# Patient Record
Sex: MALE | Race: WHITE | NOT HISPANIC OR LATINO | Employment: OTHER | URBAN - METROPOLITAN AREA
[De-identification: names, ages, dates, MRNs, and addresses within clinical notes are randomized per-mention and may not be internally consistent; named-entity substitution may affect disease eponyms.]

---

## 2018-05-15 ENCOUNTER — APPOINTMENT (OUTPATIENT)
Dept: RADIOLOGY | Facility: MEDICAL CENTER | Age: 78
End: 2018-05-15
Attending: EMERGENCY MEDICINE
Payer: MEDICARE

## 2018-05-15 ENCOUNTER — HOSPITAL ENCOUNTER (EMERGENCY)
Facility: MEDICAL CENTER | Age: 78
End: 2018-05-15
Attending: EMERGENCY MEDICINE
Payer: MEDICARE

## 2018-05-15 VITALS
SYSTOLIC BLOOD PRESSURE: 111 MMHG | OXYGEN SATURATION: 91 % | WEIGHT: 190 LBS | DIASTOLIC BLOOD PRESSURE: 59 MMHG | TEMPERATURE: 98.5 F | HEART RATE: 57 BPM | HEIGHT: 68 IN | BODY MASS INDEX: 28.79 KG/M2 | RESPIRATION RATE: 18 BRPM

## 2018-05-15 DIAGNOSIS — S09.90XA CLOSED HEAD INJURY, INITIAL ENCOUNTER: ICD-10-CM
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
## 2018-05-15 LAB
INR PPP: 1.6 (ref 0.87–1.13)
PROTHROMBIN TIME: 18.7 SEC (ref 12–14.6)

## 2018-05-15 PROCEDURE — 99284 EMERGENCY DEPT VISIT MOD MDM: CPT

## 2018-05-15 PROCEDURE — 85610 PROTHROMBIN TIME: CPT

## 2018-05-15 PROCEDURE — 70450 CT HEAD/BRAIN W/O DYE: CPT

## 2018-05-15 PROCEDURE — 36415 COLL VENOUS BLD VENIPUNCTURE: CPT

## 2018-05-15 ASSESSMENT — PAIN SCALES - WONG BAKER: WONGBAKER_NUMERICALRESPONSE: DOESN'T HURT AT ALL

## 2018-05-15 ASSESSMENT — PAIN SCALES - GENERAL: PAINLEVEL_OUTOF10: 0

## 2018-05-15 ASSESSMENT — LIFESTYLE VARIABLES: DO YOU DRINK ALCOHOL: NO

## 2018-05-16 NOTE — ED PROVIDER NOTES
"ED Provider Note    Scribed for Amanda Beck M.D. by Eugenia Pereira. 5/15/2018  6:02 PM    Primary care provider: Randall Purdy M.D.  Means of arrival: EMS  History obtained from: Patient  History limited by: None    CHIEF COMPLAINT  Chief Complaint   Patient presents with   • Head Injury     HIT HEAD, DENIES PAIN OR LOC, HOWEVER CONCERNED S/P ON WARFARIN     HPI  Randall Odell is a 78 y.o. male who presents to the Emergency Department for evaluation of ground level fall. Patient reports he fell backwards this morning and hit the back of his head at approximately 10 AM. He is currently on coumadin. Denies associated headache, loss of consciousness, dizziness, vision changes, focal weakness, numbness, tingling, neck pain, back pain, chest pain, or abdominal pain. The patient's last INR was this morning.      REVIEW OF SYSTEMS    EYES: no vision changes  CARDIAC: no chest pain, no palpitations    PULMONARY: no dyspnea, cough, or congestion   GI: no vomiting, diarrhea, or abdominal pain   : no dysuria, back pain, or hematuria   Neuro: Ground level fall, no weakness, numbness, aphasia, or headache  Musculoskeletal: no swelling, deformity, pain, or joint swelling  SKIN: no rash, erythema, or contusions     See history of present illness. All other systems are negative    PAST MEDICAL HISTORY   has a past medical history of Acid reflux disease; Diabetes (CMS-HCC); Normal pressure hydrocephalus; and Urinary incontinence.    SURGICAL HISTORY   has a past surgical history that includes other orthopedic surgery.    SOCIAL HISTORY  Social History   Substance Use Topics   • Smoking status: Never Smoker   • Smokeless tobacco: Not on file   • Alcohol use Yes      Comment: Per son in law pt drinks \"a lot\"      History   Drug Use No     FAMILY HISTORY  None pertinent     CURRENT MEDICATIONS  No current facility-administered medications on file prior to encounter.      Current Outpatient Prescriptions on File Prior to " "Encounter   Medication Sig Dispense Refill   • calcium carbonate 1000 MG Chew Tab Take 1 Tab by mouth 3 times a day, with meals. 90 Tab 0   • acetaminophen (TYLENOL) 325 MG Tab Take 2 Tabs by mouth every 6 hours as needed (Mild Pain; (Pain scale 1-3); Temp greater than 100.5 F). 30 Tab 0   • aspirin EC (ECOTRIN) 81 MG Tablet Delayed Response Take 81 mg by mouth every day.     • metformin (GLUCOPHAGE) 850 MG Tab Take 850 mg by mouth 2 times a day, with meals.     • atorvastatin (LIPITOR) 10 MG Tab Take 10 mg by mouth every day.     • mirtazapine (REMERON) 15 MG Tab Take 15 mg by mouth every evening.     • Dextromethorphan Polistirex ER (DELSYM) 30 MG/5ML Suspension Extended Release Take 60 mg by mouth every 6 hours as needed for Cough. Indications: Cough     • Calcium Carbonate-Vitamin D (CALCIUM 600 + D) 600-400 MG-UNIT Tab Take 1 Tab by mouth every day.       ALLERGIES  No Known Allergies    PHYSICAL EXAM  VITAL SIGNS: /59   Pulse (!) 50   Temp 36.9 °C (98.5 °F)   Resp 18   Ht 1.727 m (5' 8\")   Wt 86.2 kg (190 lb)   SpO2 92%   BMI 28.89 kg/m²     Constitutional: Well developed, Well nourished, No acute distress, Non-toxic appearance.   HEENT: Normocephalic, Small contusion to the posterior occipital scalp, external ears normal, pharynx pink,  Mucous  Membranes moist, No rhinorrhea or mucosal edema  Eyes: PERRL, EOMI, Conjunctiva normal, No discharge.   Neck: Normal range of motion, No tenderness, Supple, No stridor.   Lymphatic: No lymphadenopathy    Cardiovascular: Regular Rate and Rhythm, No murmurs,  rubs, or gallops.   Thorax & Lungs: Lungs clear to auscultation bilaterally, No respiratory distress, No wheezes, rhales or rhonchi, No chest wall tenderness.   Abdomen: Bowel sounds normal, Soft, non tender, non distended,  No pulsatile masses., no rebound guarding or peritoneal signs.   Skin: Warm, Dry, No erythema, No rash,   Back:  No CVA tenderness,  No spinal tenderness, bony crepitance, step " offs, or instability.   Neurologic: Alert & oriented x 3, Normal motor function, Normal sensory function, No focal deficits noted. Normal reflexes. Normal Cranial Nerves.  Extremities: Equal, intact distal pulses, No cyanosis, clubbing or edema,  No tenderness.   Musculoskeletal: Good range of motion in all major joints. No tenderness to palpation or major deformities noted.     DIAGNOSTIC STUDIES / PROCEDURES    LABS  Results for orders placed or performed during the hospital encounter of 05/15/18   PT/INR   Result Value Ref Range    PT 18.7 (H) 12.0 - 14.6 sec    INR 1.60 (H) 0.87 - 1.13       All labs reviewed by me.    RADIOLOGY  CT-HEAD W/O   Final Result      1.  Cerebral atrophy.      2.  White matter lucencies most consistent with small vessel ischemic change versus demyelination or gliosis.      3.  Otherwise, Head CT without contrast with no acute findings. No evidence of acute cerebral  hemorrhage or mass lesion.        The radiologist's interpretation of all radiological studies have been reviewed by me.    COURSE & MEDICAL DECISION MAKING  Nursing notes, VS, PMSFHx reviewed in chart.    6:02 PM - Patient seen and examined at bedside. Patient presents with a small contusion to the posterior occipital scalp secondary to ground level fall that occurred at 10 AM today.  No bony step off or crepitus. The patient is currently on coumadin so we will order  CT Head for further evaluation as well as recheck INR.  Ordered CT-Head, PT/INR to evaluate his symptoms. The differential diagnoses include but are not limited to: CHI, ICH     8:29 PM - Recheck: Patient is resting comfortably and reports feeling improved. I updated him on his results, which did not indicate acute intercranial injury. I explained that he is now stable for discharge. I advised him to follow up with his primary care provider and to return to the ED for new onset of symptoms including vomiting, headache. He understands and will comply.      DISPOSITION:  Patient will be discharged home in stable condition.    FOLLOW UP:  Randall Purdy M.D.  [address] [facility] [id number]  [place] [address]  [phone]    Call in 2 days  As needed, If symptoms worsen, for recheck    OUTPATIENT MEDICATIONS:  Discharge Medication List as of 5/15/2018  8:29 PM        FINAL IMPRESSION  1. Fall, initial encounter    2. Closed head injury, initial encounter          IEugenia (Scribe), am scribing for, and in the presence of, Amanda Beck M.D..    Electronically signed by: Eugenia Pereira (Scribe), 5/15/2018    IAmanda M.D. personally performed the services described in this documentation, as scribed by Eugenia Pereira in my presence, and it is both accurate and complete.    The note accurately reflects work and decisions made by me.  Amanda Beck  5/15/2018  8:51 PM

## 2018-05-16 NOTE — ED TRIAGE NOTES
"PT BROUGHT IN BY EMS FROM [facility]. PT STATES THAT \"THEY TIPPED ME OVER AND I HIT MY HEAD ON THE PAVEMENT\". PT CONCERNED SINCE HE TAKES WARFARIN. PT A&O, PUPILS EQUAL AND REACTIVE TO LIGHT. PT LIVES IN [facility]. CHART FROM HIS FACILITY IN IS ED PAPER CHART.  "

## 2018-05-16 NOTE — ED NOTES
Assist RN: Blood drawn and sent to lab.  Patient and family member updated on POC.  Awaiting CT.

## 2018-05-16 NOTE — ED NOTES
"Chief Complaint   Patient presents with   • Head Injury     HIT HEAD, DENIES PAIN OR LOC, HOWEVER CONCERNED S/P ON WARFARIN     ./59   Pulse (!) 49   Temp 36.9 °C (98.5 °F)   Resp 18   Ht 1.727 m (5' 8\")   Wt 86.2 kg (190 lb)   SpO2 94%   BMI 28.89 kg/m²     "

## 2018-05-16 NOTE — ED NOTES
Discharge instructions given to pt. Prescriptions unchanged. Pt educated, verbalizes understanding. All belongings accounted for. Pt wheeld out of ED with family to take back to facility.

## 2018-05-16 NOTE — DISCHARGE INSTRUCTIONS
Head Injury, Adult  There are many types of head injuries. They can be as minor as a bump. Some head injuries can be worse. Worse injuries include:  · A strong hit to the head that hurts the brain (concussion).  · A bruise of the brain (contusion). This means there is bleeding in the brain that can cause swelling.  · A cracked skull (skull fracture).  · Bleeding in the brain that gathers, gets thick (makes a clot), and forms a bump (hematoma).  Most problems from a head injury come in the first 24 hours. However, you may still have side effects up to 7-10 days after your injury. It is important to watch your condition for any changes.  Follow these instructions at home:  Activity  · Rest as much as possible.  · Avoid activities that are hard or tiring.  · Make sure you get enough sleep.  · Limit activities that need a lot of thought or attention, such as:  ¨ Watching TV.  ¨ Playing memory games and puzzles.  ¨ Job-related work or homework.  ¨ Working on the computer, social media, and texting.  · Avoid activities that could cause another head injury until your doctor says it is okay. This includes playing sports.  · Ask your doctor when it is safe for you to go back to your normal activities, such as work or school. Ask your doctor for a step-by-step plan for slowly going back to your normal activities.  · Ask your doctor when you can drive, ride a bicycle, or use heavy machinery. Never do these activities if you are dizzy.  Lifestyle  · Do not drink alcohol until your doctor says it is okay.  · Avoid drug use.  · If it is harder than usual to remember things, write them down.  · If you are easily distracted, try to do one thing at a time.  · Talk with family members or close friends when making important decisions.  · Tell your friends, family, a trusted coworker, and  about your injury, symptoms, and limits (restrictions). Have them watch for any problems that are new or getting worse.  General  instructions  · Take over-the-counter and prescription medicines only as told by your doctor.  · Have someone stay with you for 24 hours after your head injury. This person should watch you for any changes in your symptoms and be ready to get help.  · Keep all follow-up visits as told by your doctor. This is important.  Prevention  · Work on your balance and strength. This can help you avoid falls.  · Wear a seatbelt when you are in a moving vehicle.  · Wear a helmet when:  ¨ Riding a bicycle.  ¨ Skiing.  ¨ Doing any other sport or activity that has a risk of injury.  · Drink alcohol only in moderation.  · Make your home safer by:  ¨ Getting rid of clutter from the floors and stairs, like things that can make you trip.  ¨ Using grab bars in bathrooms and handrails by stairs.  ¨ Placing non-slip mats on floors and in bathtubs.  ¨ Putting more light in dim areas.  Get help right away if:  · You have:  ¨ A very bad (severe) headache that is not helped by medicine.  ¨ Trouble walking or weakness in your arms and legs.  ¨ Clear or bloody fluid coming from your nose or ears.  ¨ Changes in your seeing (vision).  ¨ Jerky movements that you cannot control (seizure).  · You throw up (vomit).  · Your symptoms get worse.  · You lose balance.  · Your speech is slurred.  · You pass out.  · You are sleepier and have trouble staying awake.  · The black centers of your eyes (pupils) change in size.  These symptoms may be an emergency. Do not wait to see if the symptoms will go away. Get medical help right away. Call your local emergency services (911 in the [place] Do not drive yourself to the hospital.   This information is not intended to replace advice given to you by your health care provider. Make sure you discuss any questions you have with your health care provider.  Document Released: 11/30/2009 Document Revised: 07/13/2017 Document Reviewed: 06/27/2017  Elsevier Interactive Patient Education © 2017 [employer]

## 2018-07-02 ENCOUNTER — APPOINTMENT (OUTPATIENT)
Dept: RADIOLOGY | Facility: MEDICAL CENTER | Age: 78
End: 2018-07-02
Attending: EMERGENCY MEDICINE
Payer: MEDICARE

## 2018-07-02 ENCOUNTER — HOSPITAL ENCOUNTER (EMERGENCY)
Facility: MEDICAL CENTER | Age: 78
End: 2018-07-03
Attending: EMERGENCY MEDICINE
Payer: MEDICARE

## 2018-07-02 VITALS
DIASTOLIC BLOOD PRESSURE: 58 MMHG | HEIGHT: 68 IN | RESPIRATION RATE: 21 BRPM | OXYGEN SATURATION: 94 % | TEMPERATURE: 97 F | BODY MASS INDEX: 28.4 KG/M2 | HEART RATE: 58 BPM | SYSTOLIC BLOOD PRESSURE: 97 MMHG | WEIGHT: 187.39 LBS

## 2018-07-02 DIAGNOSIS — R42 DIZZINESS: ICD-10-CM

## 2018-07-02 DIAGNOSIS — R00.1 BRADYCARDIA BY ELECTROCARDIOGRAM: ICD-10-CM

## 2018-07-02 DIAGNOSIS — R55 NEAR SYNCOPE: ICD-10-CM

## 2018-07-02 LAB
ALBUMIN SERPL BCP-MCNC: 3.6 G/DL (ref 3.2–4.9)
ALBUMIN/GLOB SERPL: 1.2 G/DL
ALP SERPL-CCNC: 54 U/L (ref 30–99)
ALT SERPL-CCNC: 25 U/L (ref 2–50)
ANION GAP SERPL CALC-SCNC: 10 MMOL/L (ref 0–11.9)
APTT PPP: 24.8 SEC (ref 24.7–36)
AST SERPL-CCNC: 30 U/L (ref 12–45)
BASOPHILS # BLD AUTO: 0.5 % (ref 0–1.8)
BASOPHILS # BLD: 0.04 K/UL (ref 0–0.12)
BILIRUB SERPL-MCNC: 0.6 MG/DL (ref 0.1–1.5)
BNP SERPL-MCNC: 252 PG/ML (ref 0–100)
BUN SERPL-MCNC: 21 MG/DL (ref 8–22)
CA-I SERPL-SCNC: 1.12 MMOL/L (ref 1.1–1.3)
CALCIUM SERPL-MCNC: 8.5 MG/DL (ref 8.4–10.2)
CHLORIDE SERPL-SCNC: 106 MMOL/L (ref 96–112)
CO2 SERPL-SCNC: 19 MMOL/L (ref 20–33)
CREAT SERPL-MCNC: 0.98 MG/DL (ref 0.5–1.4)
EOSINOPHIL # BLD AUTO: 0.15 K/UL (ref 0–0.51)
EOSINOPHIL NFR BLD: 2 % (ref 0–6.9)
ERYTHROCYTE [DISTWIDTH] IN BLOOD BY AUTOMATED COUNT: 43.7 FL (ref 35.9–50)
GLOBULIN SER CALC-MCNC: 3.1 G/DL (ref 1.9–3.5)
GLUCOSE SERPL-MCNC: 168 MG/DL (ref 65–99)
HCT VFR BLD AUTO: 42.4 % (ref 42–52)
HGB BLD-MCNC: 14.1 G/DL (ref 14–18)
IMM GRANULOCYTES # BLD AUTO: 0.02 K/UL (ref 0–0.11)
IMM GRANULOCYTES NFR BLD AUTO: 0.3 % (ref 0–0.9)
INR PPP: 1.45 (ref 0.87–1.13)
LYMPHOCYTES # BLD AUTO: 2.35 K/UL (ref 1–4.8)
LYMPHOCYTES NFR BLD: 30.7 % (ref 22–41)
MAGNESIUM SERPL-MCNC: 1.7 MG/DL (ref 1.5–2.5)
MCH RBC QN AUTO: 30.5 PG (ref 27–33)
MCHC RBC AUTO-ENTMCNC: 33.3 G/DL (ref 33.7–35.3)
MCV RBC AUTO: 91.8 FL (ref 81.4–97.8)
MONOCYTES # BLD AUTO: 0.81 K/UL (ref 0–0.85)
MONOCYTES NFR BLD AUTO: 10.6 % (ref 0–13.4)
NEUTROPHILS # BLD AUTO: 4.29 K/UL (ref 1.82–7.42)
NEUTROPHILS NFR BLD: 55.9 % (ref 44–72)
NRBC # BLD AUTO: 0 K/UL
NRBC BLD-RTO: 0 /100 WBC
PLATELET # BLD AUTO: 146 K/UL (ref 164–446)
PMV BLD AUTO: 11.7 FL (ref 9–12.9)
POTASSIUM SERPL-SCNC: 3.6 MMOL/L (ref 3.6–5.5)
PROT SERPL-MCNC: 6.7 G/DL (ref 6–8.2)
PROTHROMBIN TIME: 17.5 SEC (ref 12–14.6)
RBC # BLD AUTO: 4.62 M/UL (ref 4.7–6.1)
SODIUM SERPL-SCNC: 135 MMOL/L (ref 135–145)
TROPONIN I SERPL-MCNC: <0.02 NG/ML (ref 0–0.04)
TSH SERPL DL<=0.005 MIU/L-ACNC: 3.62 UIU/ML (ref 0.38–5.33)
WBC # BLD AUTO: 7.7 K/UL (ref 4.8–10.8)

## 2018-07-02 PROCEDURE — 85610 PROTHROMBIN TIME: CPT

## 2018-07-02 PROCEDURE — 85730 THROMBOPLASTIN TIME PARTIAL: CPT

## 2018-07-02 PROCEDURE — 85025 COMPLETE CBC W/AUTO DIFF WBC: CPT

## 2018-07-02 PROCEDURE — 700105 HCHG RX REV CODE 258: Performed by: EMERGENCY MEDICINE

## 2018-07-02 PROCEDURE — 94760 N-INVAS EAR/PLS OXIMETRY 1: CPT

## 2018-07-02 PROCEDURE — 93005 ELECTROCARDIOGRAM TRACING: CPT | Performed by: EMERGENCY MEDICINE

## 2018-07-02 PROCEDURE — 80053 COMPREHEN METABOLIC PANEL: CPT

## 2018-07-02 PROCEDURE — 83735 ASSAY OF MAGNESIUM: CPT

## 2018-07-02 PROCEDURE — 99285 EMERGENCY DEPT VISIT HI MDM: CPT

## 2018-07-02 PROCEDURE — 71045 X-RAY EXAM CHEST 1 VIEW: CPT

## 2018-07-02 PROCEDURE — 83880 ASSAY OF NATRIURETIC PEPTIDE: CPT

## 2018-07-02 PROCEDURE — 84484 ASSAY OF TROPONIN QUANT: CPT

## 2018-07-02 PROCEDURE — 84443 ASSAY THYROID STIM HORMONE: CPT

## 2018-07-02 PROCEDURE — 82330 ASSAY OF CALCIUM: CPT

## 2018-07-02 RX ORDER — WARFARIN SODIUM 1 MG/1
1-1.5 TABLET ORAL
Status: ON HOLD | COMMUNITY
End: 2018-11-04

## 2018-07-02 RX ORDER — METOPROLOL SUCCINATE 25 MG/1
25 TABLET, EXTENDED RELEASE ORAL 2 TIMES DAILY
Status: SHIPPED | COMMUNITY
End: 2018-07-03

## 2018-07-02 RX ORDER — SODIUM CHLORIDE, SODIUM LACTATE, POTASSIUM CHLORIDE, CALCIUM CHLORIDE 600; 310; 30; 20 MG/100ML; MG/100ML; MG/100ML; MG/100ML
1000 INJECTION, SOLUTION INTRAVENOUS ONCE
Status: COMPLETED | OUTPATIENT
Start: 2018-07-02 | End: 2018-07-03

## 2018-07-02 RX ORDER — WARFARIN SODIUM 1 MG/1
1.5 TABLET ORAL DAILY
Status: SHIPPED | COMMUNITY
End: 2018-07-03

## 2018-07-02 RX ADMIN — SODIUM CHLORIDE, POTASSIUM CHLORIDE, SODIUM LACTATE AND CALCIUM CHLORIDE 1000 ML: 600; 310; 30; 20 INJECTION, SOLUTION INTRAVENOUS at 23:23

## 2018-07-02 ASSESSMENT — PAIN SCALES - GENERAL
PAINLEVEL_OUTOF10: 0
PAINLEVEL_OUTOF10: 0

## 2018-07-03 ENCOUNTER — HOSPITAL ENCOUNTER (INPATIENT)
Facility: MEDICAL CENTER | Age: 78
LOS: 4 days | DRG: 243 | End: 2018-07-07
Attending: EMERGENCY MEDICINE | Admitting: HOSPITALIST
Payer: MEDICARE

## 2018-07-03 DIAGNOSIS — R55 NEAR SYNCOPE: ICD-10-CM

## 2018-07-03 DIAGNOSIS — R00.8 SUPRAVENTRICULAR BIGEMINY: ICD-10-CM

## 2018-07-03 DIAGNOSIS — R00.1 BRADYCARDIA: ICD-10-CM

## 2018-07-03 PROBLEM — I45.9 HEART BLOCK: Status: ACTIVE | Noted: 2018-07-03

## 2018-07-03 PROBLEM — Z79.01 ANTICOAGULATED: Status: ACTIVE | Noted: 2018-07-03

## 2018-07-03 PROBLEM — I48.91 AF (ATRIAL FIBRILLATION) (HCC): Status: ACTIVE | Noted: 2018-07-03

## 2018-07-03 PROBLEM — I48.21 PERMANENT ATRIAL FIBRILLATION (HCC): Status: ACTIVE | Noted: 2018-07-03

## 2018-07-03 PROBLEM — E66.3 OVERWEIGHT (BMI 25.0-29.9): Status: ACTIVE | Noted: 2018-07-03

## 2018-07-03 PROBLEM — G91.2 NPH (NORMAL PRESSURE HYDROCEPHALUS) (HCC): Status: ACTIVE | Noted: 2018-07-03

## 2018-07-03 PROBLEM — I48.91 AF (ATRIAL FIBRILLATION) (HCC): Status: RESOLVED | Noted: 2018-07-03 | Resolved: 2018-07-03

## 2018-07-03 PROBLEM — I65.01 OCCLUSION OF RIGHT VERTEBRAL ARTERY: Status: ACTIVE | Noted: 2018-07-03

## 2018-07-03 LAB
ANION GAP SERPL CALC-SCNC: 8 MMOL/L (ref 0–11.9)
BASOPHILS # BLD AUTO: 0.4 % (ref 0–1.8)
BASOPHILS # BLD: 0.04 K/UL (ref 0–0.12)
BUN SERPL-MCNC: 20 MG/DL (ref 8–22)
CALCIUM SERPL-MCNC: 8.8 MG/DL (ref 8.5–10.5)
CHLORIDE SERPL-SCNC: 104 MMOL/L (ref 96–112)
CO2 SERPL-SCNC: 23 MMOL/L (ref 20–33)
CREAT SERPL-MCNC: 1 MG/DL (ref 0.5–1.4)
EKG IMPRESSION: NORMAL
EOSINOPHIL # BLD AUTO: 0.08 K/UL (ref 0–0.51)
EOSINOPHIL NFR BLD: 0.8 % (ref 0–6.9)
ERYTHROCYTE [DISTWIDTH] IN BLOOD BY AUTOMATED COUNT: 44.3 FL (ref 35.9–50)
GLUCOSE BLD-MCNC: 125 MG/DL (ref 65–99)
GLUCOSE BLD-MCNC: 133 MG/DL (ref 65–99)
GLUCOSE BLD-MCNC: 180 MG/DL (ref 65–99)
GLUCOSE SERPL-MCNC: 133 MG/DL (ref 65–99)
HCT VFR BLD AUTO: 42.5 % (ref 42–52)
HGB BLD-MCNC: 14.3 G/DL (ref 14–18)
IMM GRANULOCYTES # BLD AUTO: 0.03 K/UL (ref 0–0.11)
IMM GRANULOCYTES NFR BLD AUTO: 0.3 % (ref 0–0.9)
LYMPHOCYTES # BLD AUTO: 1.84 K/UL (ref 1–4.8)
LYMPHOCYTES NFR BLD: 18.2 % (ref 22–41)
MCH RBC QN AUTO: 31 PG (ref 27–33)
MCHC RBC AUTO-ENTMCNC: 33.6 G/DL (ref 33.7–35.3)
MCV RBC AUTO: 92.2 FL (ref 81.4–97.8)
MONOCYTES # BLD AUTO: 0.89 K/UL (ref 0–0.85)
MONOCYTES NFR BLD AUTO: 8.8 % (ref 0–13.4)
NEUTROPHILS # BLD AUTO: 7.23 K/UL (ref 1.82–7.42)
NEUTROPHILS NFR BLD: 71.5 % (ref 44–72)
NRBC # BLD AUTO: 0 K/UL
NRBC BLD-RTO: 0 /100 WBC
PLATELET # BLD AUTO: 154 K/UL (ref 164–446)
PMV BLD AUTO: 11.8 FL (ref 9–12.9)
POTASSIUM SERPL-SCNC: 4.1 MMOL/L (ref 3.6–5.5)
RBC # BLD AUTO: 4.61 M/UL (ref 4.7–6.1)
SODIUM SERPL-SCNC: 135 MMOL/L (ref 135–145)
TROPONIN I SERPL-MCNC: <0.01 NG/ML (ref 0–0.04)
WBC # BLD AUTO: 10.1 K/UL (ref 4.8–10.8)

## 2018-07-03 PROCEDURE — A9270 NON-COVERED ITEM OR SERVICE: HCPCS | Performed by: HOSPITALIST

## 2018-07-03 PROCEDURE — 700102 HCHG RX REV CODE 250 W/ 637 OVERRIDE(OP): Performed by: HOSPITALIST

## 2018-07-03 PROCEDURE — 770020 HCHG ROOM/CARE - TELE (206)

## 2018-07-03 PROCEDURE — 85025 COMPLETE CBC W/AUTO DIFF WBC: CPT

## 2018-07-03 PROCEDURE — 80048 BASIC METABOLIC PNL TOTAL CA: CPT

## 2018-07-03 PROCEDURE — 700111 HCHG RX REV CODE 636 W/ 250 OVERRIDE (IP): Performed by: HOSPITALIST

## 2018-07-03 PROCEDURE — 99285 EMERGENCY DEPT VISIT HI MDM: CPT

## 2018-07-03 PROCEDURE — 82962 GLUCOSE BLOOD TEST: CPT | Mod: 91

## 2018-07-03 PROCEDURE — 3E0234Z INTRODUCTION OF SERUM, TOXOID AND VACCINE INTO MUSCLE, PERCUTANEOUS APPROACH: ICD-10-PCS | Performed by: HOSPITALIST

## 2018-07-03 PROCEDURE — 90471 IMMUNIZATION ADMIN: CPT

## 2018-07-03 PROCEDURE — 36415 COLL VENOUS BLD VENIPUNCTURE: CPT

## 2018-07-03 PROCEDURE — 84484 ASSAY OF TROPONIN QUANT: CPT

## 2018-07-03 PROCEDURE — 90732 PPSV23 VACC 2 YRS+ SUBQ/IM: CPT | Performed by: HOSPITALIST

## 2018-07-03 PROCEDURE — 93005 ELECTROCARDIOGRAM TRACING: CPT | Performed by: EMERGENCY MEDICINE

## 2018-07-03 PROCEDURE — 99223 1ST HOSP IP/OBS HIGH 75: CPT | Mod: AI | Performed by: HOSPITALIST

## 2018-07-03 RX ORDER — POLYETHYLENE GLYCOL 3350 17 G/17G
1 POWDER, FOR SOLUTION ORAL
Status: DISCONTINUED | OUTPATIENT
Start: 2018-07-03 | End: 2018-07-07 | Stop reason: HOSPADM

## 2018-07-03 RX ORDER — BISACODYL 10 MG
10 SUPPOSITORY, RECTAL RECTAL
Status: DISCONTINUED | OUTPATIENT
Start: 2018-07-03 | End: 2018-07-07 | Stop reason: HOSPADM

## 2018-07-03 RX ORDER — QUETIAPINE FUMARATE 25 MG/1
25 TABLET, FILM COATED ORAL
Status: DISCONTINUED | OUTPATIENT
Start: 2018-07-03 | End: 2018-07-07 | Stop reason: HOSPADM

## 2018-07-03 RX ORDER — ONDANSETRON 4 MG/1
4 TABLET, ORALLY DISINTEGRATING ORAL EVERY 4 HOURS PRN
Status: DISCONTINUED | OUTPATIENT
Start: 2018-07-03 | End: 2018-07-07 | Stop reason: HOSPADM

## 2018-07-03 RX ORDER — DEXTROSE MONOHYDRATE 25 G/50ML
25 INJECTION, SOLUTION INTRAVENOUS
Status: DISCONTINUED | OUTPATIENT
Start: 2018-07-03 | End: 2018-07-07 | Stop reason: HOSPADM

## 2018-07-03 RX ORDER — HEPARIN SODIUM 5000 [USP'U]/ML
5000 INJECTION, SOLUTION INTRAVENOUS; SUBCUTANEOUS EVERY 8 HOURS
Status: DISCONTINUED | OUTPATIENT
Start: 2018-07-03 | End: 2018-07-03

## 2018-07-03 RX ORDER — ATORVASTATIN CALCIUM 10 MG/1
10 TABLET, FILM COATED ORAL DAILY
Status: DISCONTINUED | OUTPATIENT
Start: 2018-07-03 | End: 2018-07-07 | Stop reason: HOSPADM

## 2018-07-03 RX ORDER — LABETALOL HYDROCHLORIDE 5 MG/ML
10 INJECTION, SOLUTION INTRAVENOUS EVERY 4 HOURS PRN
Status: DISCONTINUED | OUTPATIENT
Start: 2018-07-03 | End: 2018-07-07 | Stop reason: HOSPADM

## 2018-07-03 RX ORDER — QUETIAPINE FUMARATE 25 MG/1
25 TABLET, FILM COATED ORAL
COMMUNITY
End: 2018-12-18

## 2018-07-03 RX ORDER — ONDANSETRON 2 MG/ML
4 INJECTION INTRAMUSCULAR; INTRAVENOUS EVERY 4 HOURS PRN
Status: DISCONTINUED | OUTPATIENT
Start: 2018-07-03 | End: 2018-07-07 | Stop reason: HOSPADM

## 2018-07-03 RX ORDER — ACETAMINOPHEN 325 MG/1
650 TABLET ORAL EVERY 6 HOURS PRN
Status: DISCONTINUED | OUTPATIENT
Start: 2018-07-03 | End: 2018-07-07 | Stop reason: HOSPADM

## 2018-07-03 RX ORDER — AMOXICILLIN 250 MG
2 CAPSULE ORAL 2 TIMES DAILY
Status: DISCONTINUED | OUTPATIENT
Start: 2018-07-03 | End: 2018-07-07 | Stop reason: HOSPADM

## 2018-07-03 RX ADMIN — PNEUMOCOCCAL VACCINE POLYVALENT 25 MCG
25; 25; 25; 25; 25; 25; 25; 25; 25; 25; 25; 25; 25; 25; 25; 25; 25; 25; 25; 25; 25; 25; 25 INJECTION, SOLUTION INTRAMUSCULAR; SUBCUTANEOUS at 17:05

## 2018-07-03 RX ADMIN — ATORVASTATIN CALCIUM 10 MG: 10 TABLET, FILM COATED ORAL at 09:00

## 2018-07-03 RX ADMIN — INSULIN HUMAN 3 UNITS: 100 INJECTION, SOLUTION PARENTERAL at 12:31

## 2018-07-03 RX ADMIN — QUETIAPINE FUMARATE 25 MG: 25 TABLET ORAL at 20:30

## 2018-07-03 ASSESSMENT — COPD QUESTIONNAIRES
COPD SCREENING SCORE: 2
HAVE YOU SMOKED AT LEAST 100 CIGARETTES IN YOUR ENTIRE LIFE: NO/DON'T KNOW
IN THE PAST 12 MONTHS DO YOU DO LESS THAN YOU USED TO BECAUSE OF YOUR BREATHING PROBLEMS: DISAGREE/UNSURE
DURING THE PAST 4 WEEKS HOW MUCH DID YOU FEEL SHORT OF BREATH: NONE/LITTLE OF THE TIME
DO YOU EVER COUGH UP ANY MUCUS OR PHLEGM?: NO/ONLY WITH OCCASIONAL COLDS OR INFECTIONS

## 2018-07-03 ASSESSMENT — COGNITIVE AND FUNCTIONAL STATUS - GENERAL
HELP NEEDED FOR BATHING: A LITTLE
PERSONAL GROOMING: A LITTLE
CLIMB 3 TO 5 STEPS WITH RAILING: A LITTLE
SUGGESTED CMS G CODE MODIFIER MOBILITY: CK
MOBILITY SCORE: 18
MOVING FROM LYING ON BACK TO SITTING ON SIDE OF FLAT BED: A LITTLE
DRESSING REGULAR UPPER BODY CLOTHING: A LITTLE
TOILETING: A LITTLE
DAILY ACTIVITIY SCORE: 18
SUGGESTED CMS G CODE MODIFIER DAILY ACTIVITY: CK
TURNING FROM BACK TO SIDE WHILE IN FLAT BAD: A LITTLE
MOVING TO AND FROM BED TO CHAIR: A LITTLE
DRESSING REGULAR LOWER BODY CLOTHING: A LOT
STANDING UP FROM CHAIR USING ARMS: A LITTLE
WALKING IN HOSPITAL ROOM: A LITTLE

## 2018-07-03 ASSESSMENT — LIFESTYLE VARIABLES: EVER_SMOKED: NEVER

## 2018-07-03 ASSESSMENT — ENCOUNTER SYMPTOMS
GASTROINTESTINAL NEGATIVE: 1
MUSCULOSKELETAL NEGATIVE: 1
EYES NEGATIVE: 1
DIAPHORESIS: 1
WEAKNESS: 1
PSYCHIATRIC NEGATIVE: 1
RESPIRATORY NEGATIVE: 1
CARDIOVASCULAR NEGATIVE: 1

## 2018-07-03 ASSESSMENT — PAIN SCALES - GENERAL
PAINLEVEL_OUTOF10: 0

## 2018-07-03 ASSESSMENT — PATIENT HEALTH QUESTIONNAIRE - PHQ9
2. FEELING DOWN, DEPRESSED, IRRITABLE, OR HOPELESS: NOT AT ALL
1. LITTLE INTEREST OR PLEASURE IN DOING THINGS: NOT AT ALL
SUM OF ALL RESPONSES TO PHQ9 QUESTIONS 1 AND 2: 0

## 2018-07-03 NOTE — PROGRESS NOTES
2 RN Skin check done with RUPERTO Copeland    Patient ears and elbows intact and blanching. Generalized redness noted. Patient heels intact and blanching. Patient sacrum red and slow to marisela. Small ecchymosis/ red dime size spot to left lower buttock.

## 2018-07-03 NOTE — DISCHARGE PLANNING
Anticipated Discharge Disposition: Pending    Action: LSW received a VM from pt's daughter, Valery (ph#[phone]), requesting a call back. LSW called Valery back and was informed she is the POA and will be sending paperwork to LSW to be scanned into pt's chart. Valery lives in the [place] Area and will be travelling here over the next day or two.    Barriers to Discharge: None known at this time.    Plan: No d/c planning needs at this time. Pending POC.

## 2018-07-03 NOTE — PROGRESS NOTES
Asked to review orders as none were saved in the EMR. Basic admission orders placed after reviewing H&P. Signed out to floor doctor for further review.

## 2018-07-03 NOTE — ED NOTES
Med rec updated and complete  Allergies reviewed  Pt does not know his medications  Received MAR from [employer].

## 2018-07-03 NOTE — ED NOTES
Rounded on patient.  Warm blankets provided.  IV fluids started. Patient aware of where call light is located. No additional needs at this time.

## 2018-07-03 NOTE — ED NOTES
[place] fall assessment completed.  Patient is high risk for fall.  Interventions complete: Patient placed in yellow non slip socks,  yellow wrist band placed on patient, fall sign on door,  bed locked in low position, call light within reach,  personal possessions in reach, personal needs assessed, and patient will be moved to a more visible room if available.  Will continue to monitor safety.

## 2018-07-03 NOTE — ED PROVIDER NOTES
ED Provider Note    Scribed for Jorge Travis M.D. by Chava Thomas. 7/3/2018, 12:44 AM.    Primary care provider: Pcp Pt States None  Means of arrival: Ambulance  History obtained from: Patient  History limited by: None    CHIEF COMPLAINT  Chief Complaint   Patient presents with   • Dizziness     Pt was dizzy earlier today and was taken to [facility]. on transport pt's NP was 85/49. Was given fluid and improved. At ED pt had bradycardia. Was transfered to [facility] for cardiac consult.    • Bradycardia       HPI  Randall Odell is a 78 y.o. male who presents to the Emergency Department with complaints of dizziness onset 2 hours ago. Patient explains that about 2 hours ago he was he was sitting at his house when he had sudden onset dizziness and light-headedness. The patient reports that this episode of dizziness lasted for the duration of 30 - 45 minutes and this prompted him to come into the ED. He also endorses associated diaphoresis, clamminess, and fatigue during this episode. Patient notes that he called the ambulance and he was taken to [facility] this is where he was bradycardic in the 50's. He was sent over to [facility] for further evaluation for a possible heart block, but EMS explains that en route to the ED the patient's heart rate did not drop below 61. Patient does note that his symptoms are currently improved. He denies any history of cardiac disease.     REVIEW OF SYSTEMS  See HPI for further details. All other systems are negative.     C.     PAST MEDICAL HISTORY   has a past medical history of A-fib ([facility]); Acid reflux disease; Diabetes (HCC); Normal pressure hydrocephalus; and Urinary incontinence.    SURGICAL HISTORY   has a past surgical history that includes other orthopedic surgery.    SOCIAL HISTORY  Social History   Substance Use Topics   • Smoking status: Never Smoker   • Smokeless tobacco: Never Used   • Alcohol use Yes      Comment: Pt states he has quit drinking     "  History   Drug Use No       FAMILY HISTORY  No family history noted    CURRENT MEDICATIONS  Reviewed.  See Encounter Summary.     ALLERGIES  No Known Allergies    PHYSICAL EXAM  VITAL SIGNS: Ht 1.727 m (5' 8\")   Wt 85 kg (187 lb 6.3 oz)   SpO2 95%   BMI 28.49 kg/m²   Constitutional: Alert in no apparent distress.  HENT: No signs of trauma, Bilateral external ears normal, Nose normal.   Eyes: Pupils are equal and reactive, Conjunctiva normal, Non-icteric.   Neck: Normal range of motion, No tenderness, Supple, No stridor.   Lymphatic: No lymphadenopathy noted.   Cardiovascular: Irregular cardiac   Thorax & Lungs: Normal breath sounds, No respiratory distress, No wheezing, No chest tenderness.   Abdomen: Bowel sounds normal, Soft, No tenderness, No masses, No pulsatile masses. No peritoneal signs.  Skin: Warm, Dry, No erythema, No rash. Pale   Back: No bony tenderness, No CVA tenderness.   Extremities: Intact distal pulses, No edema, No tenderness, No cyanosis  Musculoskeletal: Good range of motion in all major joints. No tenderness to palpation or major deformities noted.   Neurologic: Alert , Normal motor function, Normal sensory function, No focal deficits noted.   Psychiatric: Affect normal, Judgment normal, Mood normal.     DIAGNOSTIC STUDIES / PROCEDURES     LABS  Results for orders placed or performed during the hospital encounter of 07/03/18   CBC w/ Differential   Result Value Ref Range    WBC 10.1 4.8 - 10.8 K/uL    RBC 4.61 (L) 4.70 - 6.10 M/uL    Hemoglobin 14.3 14.0 - 18.0 g/dL    Hematocrit 42.5 42.0 - 52.0 %    MCV 92.2 81.4 - 97.8 fL    MCH 31.0 27.0 - 33.0 pg    MCHC 33.6 (L) 33.7 - 35.3 g/dL    RDW 44.3 35.9 - 50.0 fL    Platelet Count 154 (L) 164 - 446 K/uL    MPV 11.8 9.0 - 12.9 fL    Neutrophils-Polys 71.50 44.00 - 72.00 %    Lymphocytes 18.20 (L) 22.00 - 41.00 %    Monocytes 8.80 0.00 - 13.40 %    Eosinophils 0.80 0.00 - 6.90 %    Basophils 0.40 0.00 - 1.80 %    Immature Granulocytes 0.30 " 0.00 - 0.90 %    Nucleated RBC 0.00 /100 WBC    Neutrophils (Absolute) 7.23 1.82 - 7.42 K/uL    Lymphs (Absolute) 1.84 1.00 - 4.80 K/uL    Monos (Absolute) 0.89 (H) 0.00 - 0.85 K/uL    Eos (Absolute) 0.08 0.00 - 0.51 K/uL    Baso (Absolute) 0.04 0.00 - 0.12 K/uL    Immature Granulocytes (abs) 0.03 0.00 - 0.11 K/uL    NRBC (Absolute) 0.00 K/uL   Basic Metabolic Panel (BMP)   Result Value Ref Range    Sodium 135 135 - 145 mmol/L    Potassium 4.1 3.6 - 5.5 mmol/L    Chloride 104 96 - 112 mmol/L    Co2 23 20 - 33 mmol/L    Glucose 133 (H) 65 - 99 mg/dL    Bun 20 8 - 22 mg/dL    Creatinine 1.00 0.50 - 1.40 mg/dL    Calcium 8.8 8.5 - 10.5 mg/dL    Anion Gap 8.0 0.0 - 11.9   Troponin STAT   Result Value Ref Range    Troponin I <0.01 0.00 - 0.04 ng/mL   ESTIMATED GFR   Result Value Ref Range    GFR If African American >60 >60 mL/min/1.73 m 2    GFR If Non African American >60 >60 mL/min/1.73 m 2   EKG (ER)   Result Value Ref Range    Report       [facility] Emergency Dept.    Test Date:  2018  Pt Name:    BRANDI HERNANDEZ                 Department: ER  MRN:        [id number]                      Room:        03  Gender:     Male                         Technician: [id number]  :        [id number]                   Requested By:CARMEL DOWNING  Order #:    [id number]                    Reading MD:    Measurements  Intervals                                Axis  Rate:       67                           P:          24  WY:         276                          QRS:        -35  QRSD:       78                           T:          -14  QT:         412  QTc:        435    Interpretive Statements  SINUS RHYTHM  SUPRAVENTRICULAR BIGEMINY  FIRST DEGREE AV BLOCK  LEFT AXIS DEVIATION  ANTERIOR INFARCT, AGE INDETERMINATE  BORDERLINE T ABNORMALITIES, INFERIOR LEADS  Compared to ECG 2018 22:50:49  Atrial premature complex(es) now present  First degree AV block now present  Left-axis deviation now present  T-wave  abnormal ity now present  Sinus bradycardia no longer present  Sinus pause or arrest no longer present  Myocardial infarct finding still present         All labs were reviewed by me.    EKG  12 Lead EKG interpreted by me to show:  Performed at 0034  Sinus rhythm  Rate 67  Axis: Left axis deviation  Intervals: Supraventricular bigeminy, first degree aV block with WI interval 276  No acute ST-T wave changes  Comparison from earlier at 2250 indicates slightly improved rate     COURSE & MEDICAL DECISION MAKING  Pertinent Labs & Imaging studies reviewed. (See chart for details)      12:44 AM - Patient seen and examined at bedside. Ordered CBC with differential, BMP, troponin STAT, EKG to evaluate his symptoms.     1:08 AM - ordered estimated GFR    1:13 AM Paged Hospitalist.     1:33 AM I discussed the patient's case and the above findings with Dr. Costello (hospitalist) who agrees to admit    2:36 AM - patient will be treated with 10 mg Lipitor      Decision Making:  This is a 78 y.o. year old male who presents with near-syncope. Patient was transferred from [facility] as the patient was found to have a first-degree heart block with sinus pauses and bradycardia at that facility. Patient currently in a supraventricular bigeminy. Feeling better in the interim but she reports he feels is secondary to the IV fluid is administered by EMS and he ER. Repeat lab as above. Discussed the case with the hospital service was global to ongoing inpatient cardiac evaluation for his current dysrhythmia.    FINAL IMPRESSION  1. Near syncope    2. Supraventricular bigeminy    3. Bradycardia        -ADMIT-       Chava ROWE (Duane), am scribing for, and in the presence of, Jorge Travis M.D..    Electronically signed by: Chava Thomas (Scribe), 7/3/2018    Jorge ROWE M.D. personally performed the services described in this documentation, as scribed by Chava Thomas in my presence, and it is both accurate and complete.    The  note accurately reflects work and decisions made by me.  Jorge Travis  7/3/2018  3:31 AM

## 2018-07-03 NOTE — H&P
Hospital Medicine History & Physical Note    Date of Service  7/3/2018    Primary Care Physician  Pcp Pt States None    Consultants  none    Code Status  Full code     Chief Complaint  Dizziness and light-headedness    History of Presenting Illness  78 y.o. male with history of atrial fibrillation with rate controlled, diabetes mellitus on oral medication regimen, was in his usual state of health until the day prior to admission.  He was at his home, doing his normal activities, when he had the onset of lightheadedness, this was associated with diaphoresis, he reports that the onset was sudden with no warning.  He reports that the lightheadedness lasted about 45 minutes and resolved on its own.  He presented to the emergency department for further evaluation.  He was initially taken to [facility], however he was transferred here for need of cardiology.    Review of Systems  Review of Systems   Constitutional: Positive for diaphoresis.   HENT: Negative.    Eyes: Negative.    Respiratory: Negative.    Cardiovascular: Negative.    Gastrointestinal: Negative.    Genitourinary: Negative.    Musculoskeletal: Negative.    Skin: Negative.    Neurological: Positive for weakness.   Endo/Heme/Allergies: Negative.    Psychiatric/Behavioral: Negative.        Past Medical History   has a past medical history of A-fib ([facility]); Acid reflux disease; Diabetes ([facility]); Normal pressure hydrocephalus; and Urinary incontinence.    Surgical History   has a past surgical history that includes other orthopedic surgery.     Family History  family history includes Heart Disease in his father; No Known Problems in his mother.     Social History   reports that he has never smoked. He has never used smokeless tobacco. He reports that he drinks alcohol. He reports that he does not use drugs.    Allergies  No Known Allergies    Medications  Prior to Admission Medications   Prescriptions Last Dose Informant Patient Reported? Taking?    Calcium Carbonate-Vitamin D (CALCIUM 600 + D) 600-400 MG-UNIT Tab 7/2/2018 at Unknown time Patient's Home Pharmacy Yes No   Sig: Take 1 Tab by mouth every day.   Dextromethorphan Polistirex ER (DELSYM) 30 MG/5ML Suspension Extended Release  Patient's Home Pharmacy Yes No   Sig: Take 60 mg by mouth every 6 hours as needed for Cough. Indications: Cough   acetaminophen (TYLENOL) 325 MG Tab   No No   Sig: Take 2 Tabs by mouth every 6 hours as needed (Mild Pain; (Pain scale 1-3); Temp greater than 100.5 F).   aspirin EC (ECOTRIN) 81 MG Tablet Delayed Response  Patient's Home Pharmacy Yes No   Sig: Take 81 mg by mouth every day.   atorvastatin (LIPITOR) 10 MG Tab 7/2/2018 at Unknown time Patient's Home Pharmacy Yes No   Sig: Take 10 mg by mouth every day.   calcium carbonate 1000 MG Chew Tab   No No   Sig: Take 1 Tab by mouth 3 times a day, with meals.   metformin (GLUCOPHAGE) 850 MG Tab 7/2/2018 at Unknown time Patient's Home Pharmacy Yes No   Sig: Take 850 mg by mouth 2 times a day, with meals.   metoprolol SR (TOPROL XL) 25 MG TABLET SR 24 HR 7/2/2018 at Unknown time  Yes No   Sig: Take 25 mg by mouth 2 Times a Day.   mirtazapine (REMERON) 15 MG Tab 7/2/2018 at Unknown time Patient's Home Pharmacy Yes No   Sig: Take 15 mg by mouth every evening.   warfarin (COUMADIN) 1 MG Tab   Yes No   Sig: Take 1 mg by mouth.   warfarin (COUMADIN) 1 MG Tab   Yes No   Sig: Take 1.5 mg by mouth every day.      Facility-Administered Medications: None       Physical Exam          Pulse: 61       Pulse Oximetry: 94 %     Physical Exam    Laboratory:  Recent Labs      07/02/18 2251 07/03/18   0108   WBC  7.7  10.1   RBC  4.62*  4.61*   HEMOGLOBIN  14.1  14.3   HEMATOCRIT  42.4  42.5   MCV  91.8  92.2   MCH  30.5  31.0   MCHC  33.3*  33.6*   RDW  43.7  44.3   PLATELETCT  146*  154*   MPV  11.7  11.8     Recent Labs      07/02/18 2251 07/03/18   0108   SODIUM  135  135   POTASSIUM  3.6  4.1   CHLORIDE  106  104   CO2  19*  23    GLUCOSE  168*  133*   BUN  21  20   CREATININE  0.98  1.00   CALCIUM  8.5  8.8     Recent Labs      07/02/18   2251  07/03/18   0108   ALTSGPT  25   --    ASTSGOT  30   --    ALKPHOSPHAT  54   --    TBILIRUBIN  0.6   --    GLUCOSE  168*  133*     Recent Labs      07/02/18   2251   APTT  24.8   INR  1.45*     Recent Labs      07/02/18 2251   BNPBTYPENAT  252*         Lab Results   Component Value Date    TROPONINI <0.01 07/03/2018       Urinalysis:    Lab Results   Component Value Date    SPECGRAVITY 1.025 12/24/2016    GLUCOSEUR Negative 12/24/2016    KETONES Negative 12/24/2016    NITRITE Negative 12/24/2016        Imaging:  No orders to display   Nor new imaging for review       Assessment/Plan:  I anticipate this patient will require at least two midnights for appropriate medical management, necessitating inpatient admission.    Overweight (BMI 25.0-29.9)   Assessment & Plan    Body mass index is 28.49 kg/m².          Heart block   Assessment & Plan    First-degree AV block on EKG.  Continue to monitor with telemetry        Symptomatic bradycardia   Assessment & Plan    Patient is prescribed beta-blockers, however he has not been compliant with them for several weeks.  Monitor off current blood pressure regimen as measured blood pressure is marginal.  Will need cardiology or electrophysiology consultation.            VTE prophylaxis: SCD lovenox

## 2018-07-03 NOTE — ED PROVIDER NOTES
ED Provider Note    CHIEF COMPLAINT  Chief Complaint   Patient presents with   • Hypotension     approximately 1 hour ago   • Dizziness     approximately 1 hour ago        [facility]    Primary care provider: Randall Purdy M.D.   History obtained from: Patient  History limited by: None     Randall Odell is a 78 y.o. male who presents to the ED by EMS complaining of sudden onset of dizziness described as near syncope while he was laying in bed shortly prior to arrival.  Patient states that he did not actually pass out.  He denies any pain/shortness breath or difficulty breathing.  He did feel clammy with nausea without any vomiting.  He reports that he is no longer nauseated or clammy at this time.  No prior history of similar symptoms.  He has not noticed anything that makes his symptoms better or worse.  He denies any recent illness/fever/cough.  He denies diarrhea/constipation/dysuria.  Patient with history of atrial fibrillation and is on Coumadin.  Patient states that he has been taking his medications without any recent changes.    REVIEW OF SYSTEMS  Please see HPI for pertinent positives/negatives.  All other systems reviewed and are negative.     PAST MEDICAL HISTORY  Past Medical History:   Diagnosis Date   • A-fib (HCC)     Unknown date when dx   • Acid reflux disease    • Diabetes (HCC)     Type II   • Normal pressure hydrocephalus    • Urinary incontinence         SURGICAL HISTORY  Past Surgical History:   Procedure Laterality Date   • OTHER ORTHOPEDIC SURGERY      both shoulders and right knee        SOCIAL HISTORY  Social History     Social History Main Topics   • Smoking status: Never Smoker   • Smokeless tobacco: Never Used   • Alcohol use Yes      Comment: Pt states he has quit drinking   • Drug use: No   • Sexual activity: Not on file        FAMILY HISTORY  History reviewed. No pertinent family history.     CURRENT MEDICATIONS  Home Medications     Reviewed by Emili Harrell R.N. (Registered Nurse)  "on 07/02/18 at 2243  Med List Status: Partial   Medication Last Dose Status   acetaminophen (TYLENOL) 325 MG Tab  Active   aspirin EC (ECOTRIN) 81 MG Tablet Delayed Response  Active   atorvastatin (LIPITOR) 10 MG Tab 7/2/2018 Active   calcium carbonate 1000 MG Chew Tab  Active   Calcium Carbonate-Vitamin D (CALCIUM 600 + D) 600-400 MG-UNIT Tab 7/2/2018 Active   Dextromethorphan Polistirex ER (DELSYM) 30 MG/5ML Suspension Extended Release  Active   metformin (GLUCOPHAGE) 850 MG Tab 7/2/2018 Active   metoprolol SR (TOPROL XL) 25 MG TABLET SR 24 HR 7/2/2018 Active   mirtazapine (REMERON) 15 MG Tab 7/2/2018 Active   warfarin (COUMADIN) 1 MG Tab  Active   warfarin (COUMADIN) 1 MG Tab  Active                 ALLERGIES  No Known Allergies     PHYSICAL EXAM  VITAL SIGNS: BP (!) 97/58   Pulse (!) 58   Temp 36.1 °C (97 °F)   Resp (!) 21   Ht 1.727 m (5' 8\")   Wt 85 kg (187 lb 6.3 oz)   SpO2 94%   BMI 28.49 kg/m²  @JUDI[[id number]::@     Pulse ox interpretation: 94% I interpret this pulse ox as normal     Constitutional: Well developed, well nourished, alert in no apparent distress, nontoxic appearance    HENT: No external signs of trauma, normocephalic, oropharynx moist and clear, nose normal    Eyes: PERRL, conjunctiva without erythema, no discharge, no icterus    Neck: Soft and supple, trachea midline, no stridor, no tenderness, no LAD, no JVD, good ROM    Cardiovascular: Bradycardia with ectopic beats, no murmurs/rubs/gallops, strong distal pulses and good perfusion    Thorax & Lungs: No respiratory distress, CTAB, no chest tenderness    Abdomen: Soft, nontender, nondistended, no guarding, no rebound, normal BS    Extremities: No cyanosis, no edema, no gross deformity, good ROM, no tenderness, intact distal pulses with brisk cap refill    Skin: Warm, dry, no pallor/cyanosis, no rash noted    Lymphatic: No lymphadenopathy noted    Neuro: A/O times 3, no focal deficits noted    Psychiatric: Cooperative  "           DIAGNOSTIC STUDIES / PROCEDURES    EKG  12 Lead EKG obtained at 2250 and interpreted by me:   Rate: 59   Rhythm: Sinus bradycardia with pauses  Intervals: First-degree block  Axis: LAD   Q waves: Inferior and precordial leads   ST segments: No significant elevations or depression  T Waves: Flattened    Clinical Impression: Sinus bradycardia with pauses, nonspecific ST-T wave changes       LABS  All labs reviewed by me.     Results for orders placed or performed during the hospital encounter of 07/02/18   CBC WITH DIFFERENTIAL   Result Value Ref Range    WBC 7.7 4.8 - 10.8 K/uL    RBC 4.62 (L) 4.70 - 6.10 M/uL    Hemoglobin 14.1 14.0 - 18.0 g/dL    Hematocrit 42.4 42.0 - 52.0 %    MCV 91.8 81.4 - 97.8 fL    MCH 30.5 27.0 - 33.0 pg    MCHC 33.3 (L) 33.7 - 35.3 g/dL    RDW 43.7 35.9 - 50.0 fL    Platelet Count 146 (L) 164 - 446 K/uL    MPV 11.7 9.0 - 12.9 fL    Neutrophils-Polys 55.90 44.00 - 72.00 %    Lymphocytes 30.70 22.00 - 41.00 %    Monocytes 10.60 0.00 - 13.40 %    Eosinophils 2.00 0.00 - 6.90 %    Basophils 0.50 0.00 - 1.80 %    Immature Granulocytes 0.30 0.00 - 0.90 %    Nucleated RBC 0.00 /100 WBC    Neutrophils (Absolute) 4.29 1.82 - 7.42 K/uL    Lymphs (Absolute) 2.35 1.00 - 4.80 K/uL    Monos (Absolute) 0.81 0.00 - 0.85 K/uL    Eos (Absolute) 0.15 0.00 - 0.51 K/uL    Baso (Absolute) 0.04 0.00 - 0.12 K/uL    Immature Granulocytes (abs) 0.02 0.00 - 0.11 K/uL    NRBC (Absolute) 0.00 K/uL   COMP METABOLIC PANEL   Result Value Ref Range    Sodium 135 135 - 145 mmol/L    Potassium 3.6 3.6 - 5.5 mmol/L    Chloride 106 96 - 112 mmol/L    Co2 19 (L) 20 - 33 mmol/L    Anion Gap 10.0 0.0 - 11.9    Glucose 168 (H) 65 - 99 mg/dL    Bun 21 8 - 22 mg/dL    Creatinine 0.98 0.50 - 1.40 mg/dL    Calcium 8.5 8.4 - 10.2 mg/dL    AST(SGOT) 30 12 - 45 U/L    ALT(SGPT) 25 2 - 50 U/L    Alkaline Phosphatase 54 30 - 99 U/L    Total Bilirubin 0.6 0.1 - 1.5 mg/dL    Albumin 3.6 3.2 - 4.9 g/dL    Total Protein 6.7 6.0 -  8.2 g/dL    Globulin 3.1 1.9 - 3.5 g/dL    A-G Ratio 1.2 g/dL   TROPONIN   Result Value Ref Range    Troponin I <0.02 0.00 - 0.04 ng/mL   BTYPE NATRIURETIC PEPTIDE   Result Value Ref Range    B Natriuretic Peptide 252 (H) 0 - 100 pg/mL   MAGNESIUM   Result Value Ref Range    Magnesium 1.7 1.5 - 2.5 mg/dL   TSH   Result Value Ref Range    TSH 3.620 0.380 - 5.330 uIU/mL   PROTHROMBIN TIME (INR)   Result Value Ref Range    PT 17.5 (H) 12.0 - 14.6 sec    INR 1.45 (H) 0.87 - 1.13   APTT   Result Value Ref Range    APTT 24.8 24.7 - 36.0 sec   IONIZED CALCIUM   Result Value Ref Range    Ionized Calcium 1.12 1.10 - 1.30 mmol/L   ESTIMATED GFR   Result Value Ref Range    GFR If African American >60 >60 mL/min/1.73 m 2    GFR If Non African American >60 >60 mL/min/1.73 m 2   EKG (ER)   Result Value Ref Range    Report       [facility]    Test Date:  2018  Pt Name:    BRANDI HERNANDEZ                 Department: [facility]  MRN:        [id number]                      Room:       -ROOM 4  Gender:     Male                         Technician: HRR  :        [id number]                   Requested By:LAURITA SAUNDERS  Order #:    [id number]                    Reading MD:    Measurements  Intervals                                Axis  Rate:       59                           P:          -42  NM:         203                          QRS:        -40  QRSD:       88                           T:          -15  QT:         424  QTc:        420    Interpretive Statements  Sinus bradycardia  Sinus pause  Anterolateral infarct, age indeterminate  Compared to ECG 2016 12:25:26  Sinus pause or arrest now present  Sinus rhythm no longer present  Myocardial infarct finding still present          RADIOLOGY  The radiologist's interpretation of all radiological studies have been reviewed by me.     DX-CHEST-PORTABLE (1 VIEW)   Final Result      Mild improvement in reticular pulmonary opacification likely  indicates underlying pulmonary scarring with possible superimposed edema             COURSE & MEDICAL DECISION MAKING  Nursing notes, VS, PMSFHx reviewed in chart.     Review of past medical records shows the patient was last seen at [facility] ED on May 15, 2018 for head injury.      Differential diagnoses considered include but are not limited to: Atrial fib/flutter, SVT, ventricular tachycardia, PAC, PVC, AMI, CHF valvular heart disease, thyroid disorder, electrolyte abnormality,       2335: D/W Dr. Garza, hospitalist.  He would like patient transferred to the [facility] ED.    2345: D/W Dr. Antony Travis, ED physician at the [facility], who will accept the patient for transfer.      Patient presents to the ED with above complaint.  EKG showed what appears to be sinus bradycardia with pauses and nonspecific ST-T wave changes.  Labs were fairly unremarkable.  Chest x-ray with findings as above.  Patient noted to be hypotensive with this bradycardia and IV fluid was initiated.  There was no significant change in his status.  I discussed the findings with the patient and he is agreeable to admission.  Findings were discussed with Dr. Garza and Dr. Travis and patient will be transferred to the [facility] ED for admission and further care.        CRITICAL CARE NOTE:  Total critical care time spent on this patient was 35 minutes exclusive of separately billable procedures.  This may include direct bedside patient care, speaking with family members, review of past medical records, reviewing the results of laboratory/diagnostic studies, consulting with other physicians, as well as evaluating the response to the therapy instituted.          FINAL IMPRESSION  1. Bradycardia by electrocardiogram    2. Dizziness    3. Near syncope           DISPOSITION  Patient will be transferred to the [facility] ED      Electronically signed by: Juaquin Crandall, 7/2/2018 10:57 PM      Portions of this record were made with voice  recognition software.  Despite my review, spelling/grammar/context errors may still remain.  Interpretation of this chart should be taken in this context.

## 2018-07-03 NOTE — ED NOTES
assisting with care, pt awake, appears alert, poss heart block on moniter. palpable radial pulse, heart tones irreg. erp aware, orders recvd

## 2018-07-03 NOTE — ED NOTES
".  Chief Complaint   Patient presents with   • Hypotension     approximately 1 hour ago   • Dizziness     approximately 1 hour ago   .BP (!) 97/58   Pulse 66   Temp 36.1 °C (97 °F)   Resp 16   Ht 1.727 m (5' 8\")   Wt 85 kg (187 lb 6.3 oz)   SpO2 93%   BMI 28.49 kg/m²     Rounded on patient.  Oriented to the room and call button. Patient assessed, chart reviewed. Call light within reach, bed in lowest position. Patient updated on plan of care, no additional needs at this time. Will continue to monitor.   "

## 2018-07-03 NOTE — ED TRIAGE NOTES
"Chief Complaint   Patient presents with   • Dizziness     Pt was dizzy earlier today and was taken to [facility]. on transport pt's NP was 85/49. Was given fluid and improved. At ED pt had bradycardia. Was transfered to [facility] for cardiac consult.    • Bradycardia       Ht 1.727 m (5' 8\")   Wt 85 kg (187 lb 6.3 oz)   SpO2 95%   BMI 28.49 kg/m²   /60     Pt BIB EMS, transfer from [facility]. PT was feeling dizzy last night and was taken to [facility], on transport pt became hypotensive 85/49. Was given fluids by EMS, and dizziness improved. At [facility] ER pt became bradycardic, was transferred to [facility] ER for possible cardiac consult.   "

## 2018-07-03 NOTE — CARE PLAN
Problem: Communication  Goal: The ability to communicate needs accurately and effectively will improve  Outcome: PROGRESSING AS EXPECTED      Problem: Infection  Goal: Will remain free from infection  Outcome: PROGRESSING AS EXPECTED      Problem: Bowel/Gastric:  Goal: Normal bowel function is maintained or improved  Outcome: PROGRESSING AS EXPECTED      Problem: Knowledge Deficit  Goal: Knowledge of disease process/condition, treatment plan, diagnostic tests, and medications will improve  Outcome: PROGRESSING AS EXPECTED

## 2018-07-03 NOTE — PROGRESS NOTES
Assumed care of patient, bedside report received from Kike. Updated on POC, call light within reach and fall precautions in place. Bed locked and in lowest position. Patient instructed to call for assistance before getting out of bed. All questions answered, no other needs at this time. Care taker at bedside. Patient in Afib on monitor at this time.

## 2018-07-04 PROBLEM — E78.5 DYSLIPIDEMIA: Status: ACTIVE | Noted: 2018-07-04

## 2018-07-04 PROBLEM — E11.9 TYPE 2 DIABETES MELLITUS WITHOUT COMPLICATION, WITHOUT LONG-TERM CURRENT USE OF INSULIN (HCC): Status: ACTIVE | Noted: 2018-07-04

## 2018-07-04 LAB
EKG IMPRESSION: NORMAL
GLUCOSE BLD-MCNC: 100 MG/DL (ref 65–99)
GLUCOSE BLD-MCNC: 103 MG/DL (ref 65–99)
GLUCOSE BLD-MCNC: 106 MG/DL (ref 65–99)
GLUCOSE BLD-MCNC: 185 MG/DL (ref 65–99)

## 2018-07-04 PROCEDURE — 770020 HCHG ROOM/CARE - TELE (206)

## 2018-07-04 PROCEDURE — A9270 NON-COVERED ITEM OR SERVICE: HCPCS | Performed by: HOSPITALIST

## 2018-07-04 PROCEDURE — 99232 SBSQ HOSP IP/OBS MODERATE 35: CPT | Performed by: HOSPITALIST

## 2018-07-04 PROCEDURE — 700102 HCHG RX REV CODE 250 W/ 637 OVERRIDE(OP): Performed by: HOSPITALIST

## 2018-07-04 PROCEDURE — 700105 HCHG RX REV CODE 258: Performed by: HOSPITALIST

## 2018-07-04 PROCEDURE — 82962 GLUCOSE BLOOD TEST: CPT | Mod: 91

## 2018-07-04 RX ORDER — SODIUM CHLORIDE 9 MG/ML
INJECTION, SOLUTION INTRAVENOUS CONTINUOUS
Status: DISCONTINUED | OUTPATIENT
Start: 2018-07-04 | End: 2018-07-06

## 2018-07-04 RX ADMIN — STANDARDIZED SENNA CONCENTRATE AND DOCUSATE SODIUM 2 TABLET: 8.6; 5 TABLET, FILM COATED ORAL at 06:32

## 2018-07-04 RX ADMIN — STANDARDIZED SENNA CONCENTRATE AND DOCUSATE SODIUM 2 TABLET: 8.6; 5 TABLET, FILM COATED ORAL at 17:48

## 2018-07-04 RX ADMIN — INSULIN HUMAN 2 UNITS: 100 INJECTION, SOLUTION PARENTERAL at 20:22

## 2018-07-04 RX ADMIN — SODIUM CHLORIDE: 9 INJECTION, SOLUTION INTRAVENOUS at 17:52

## 2018-07-04 RX ADMIN — QUETIAPINE FUMARATE 25 MG: 25 TABLET ORAL at 20:20

## 2018-07-04 RX ADMIN — ATORVASTATIN CALCIUM 10 MG: 10 TABLET, FILM COATED ORAL at 06:33

## 2018-07-04 RX ADMIN — SODIUM CHLORIDE: 9 INJECTION, SOLUTION INTRAVENOUS at 08:14

## 2018-07-04 ASSESSMENT — ENCOUNTER SYMPTOMS
MYALGIAS: 0
PND: 0
FEVER: 0
DEPRESSION: 0
BACK PAIN: 0
DOUBLE VISION: 0
NECK PAIN: 0
PHOTOPHOBIA: 0
DIAPHORESIS: 0
EYE PAIN: 0
HEMOPTYSIS: 0
BLURRED VISION: 0
DIARRHEA: 0
NAUSEA: 0
HEARTBURN: 0
LOSS OF CONSCIOUSNESS: 0
DIZZINESS: 0
SHORTNESS OF BREATH: 0
ABDOMINAL PAIN: 0
PALPITATIONS: 0
VOMITING: 0
WEIGHT LOSS: 0
HALLUCINATIONS: 0
WEAKNESS: 0
COUGH: 0
CHILLS: 0
ORTHOPNEA: 0
CLAUDICATION: 0

## 2018-07-04 ASSESSMENT — PAIN SCALES - GENERAL
PAINLEVEL_OUTOF10: 0

## 2018-07-04 ASSESSMENT — LIFESTYLE VARIABLES: SUBSTANCE_ABUSE: 0

## 2018-07-04 NOTE — PROGRESS NOTES
[facility] Hospitalist Progress Note    Date of Service: 2018    Chief Complaint  78 y.o. male admitted 7/3/2018 with  Sinus pauses and bradycardia    Interval Problem Update  Afebrile    Known diabetic    No chest pain, sob or dizziness    For cardiac pacemaker today    Consultants/Specialty    cardiology    Disposition  home        Review of Systems   Constitutional: Negative for chills, diaphoresis, fever, malaise/fatigue and weight loss.   HENT: Negative for ear discharge, ear pain, hearing loss and tinnitus.    Eyes: Negative for blurred vision, double vision, photophobia and pain.   Respiratory: Negative for cough and hemoptysis.    Cardiovascular: Negative for chest pain, palpitations, orthopnea and claudication.   Gastrointestinal: Negative for abdominal pain, diarrhea, heartburn, nausea and vomiting.   Genitourinary: Negative for dysuria, frequency, hematuria and urgency.   Musculoskeletal: Negative for back pain, joint pain, myalgias and neck pain.   Neurological: Negative for dizziness.   Psychiatric/Behavioral: Negative for depression, hallucinations, substance abuse and suicidal ideas.   All other systems reviewed and are negative.     Physical Exam  Laboratory/Imaging   Hemodynamics  Temp (24hrs), Av.3 °C (97.4 °F), Min:36.1 °C (97 °F), Max:36.6 °C (97.9 °F)   Temperature: 36.3 °C (97.3 °F)  Pulse  Av.7  Min: 50  Max: 115 Heart Rate (Monitored): 88  Blood Pressure : 105/64, NIBP: 122/77      Respiratory      Respiration: 18, Pulse Oximetry: 94 %             Fluids    Intake/Output Summary (Last 24 hours) at 18 1015  Last data filed at 18 0518   Gross per 24 hour   Intake              360 ml   Output             2250 ml   Net            -1890 ml       Nutrition  Orders Placed This Encounter   Procedures   • Diet Order Consistent Carbohydrate, 2 Gram Sodium     Standing Status:   Standing     Number of Occurrences:   1     Order Specific Question:   Diet:     Answer:   Consistent  Carbohydrate [4]     Order Specific Question:   Diet:     Answer:   2 Gram Sodium [7]   • Diet NPO: 8 hours prior to procedure for afternoon cases. RN to order clear liquid diet for breakfast then NPO after breakfast for afternoon procedures     Standing Status:   Standing     Number of Occurrences:   8     Order Specific Question:   Restrict to:     Answer:   Sips with Medications [3]     Physical Exam   Constitutional: He is oriented to person, place, and time. No distress.   Eyes: Left eye exhibits no discharge.   Neck: No tracheal deviation present. No thyromegaly present.   Cardiovascular: Normal rate.  Exam reveals no gallop and no friction rub.    No murmur heard.  irregular   Pulmonary/Chest: No respiratory distress. He has no wheezes. He exhibits no tenderness.   Abdominal: He exhibits no distension. There is no tenderness. There is no guarding.   Musculoskeletal: He exhibits no edema, tenderness or deformity.   Neurological: He is alert and oriented to person, place, and time. No cranial nerve deficit. Coordination normal.   Skin: He is not diaphoretic.       Recent Labs      07/02/18 2251 07/03/18   0108   WBC  7.7  10.1   RBC  4.62*  4.61*   HEMOGLOBIN  14.1  14.3   HEMATOCRIT  42.4  42.5   MCV  91.8  92.2   MCH  30.5  31.0   MCHC  33.3*  33.6*   RDW  43.7  44.3   PLATELETCT  146*  154*   MPV  11.7  11.8     Recent Labs      07/02/18 2251  07/03/18   0108   SODIUM  135  135   POTASSIUM  3.6  4.1   CHLORIDE  106  104   CO2  19*  23   GLUCOSE  168*  133*   BUN  21  20   CREATININE  0.98  1.00   CALCIUM  8.5  8.8     Recent Labs      07/02/18   2251   APTT  24.8   INR  1.45*     Recent Labs      07/02/18   2251   BNPBTYPENAT  252*              Assessment/Plan     Symptomatic bradycardia   Assessment & Plan    Hold b blocker    Pacer today            Permanent atrial fibrillation (HCC)- (present on admission)   Assessment & Plan    Rate controll    Hold lopressor    Hold coumadin for pacer        Type 2  diabetes mellitus without complication, without long-term current use of insulin (HCC)- (present on admission)   Assessment & Plan    Hold metformin    fs with ssi        Dyslipidemia- (present on admission)   Assessment & Plan    Cont lipitor        Overweight (BMI 25.0-29.9)- (present on admission)   Assessment & Plan    Body mass index is 28.49 kg/m².          Heart block   Assessment & Plan    First-degree AV block on EKG.  Continue to monitor with telemetry          Quality-Core Measures   [place] catheter::  No [place]  DVT prophylaxis - mechanical:  SCDs

## 2018-07-04 NOTE — CARE PLAN
Problem: Safety  Goal: Will remain free from injury  Outcome: PROGRESSING AS EXPECTED      Problem: Infection  Goal: Will remain free from infection  Outcome: PROGRESSING AS EXPECTED      Problem: Knowledge Deficit  Goal: Knowledge of disease process/condition, treatment plan, diagnostic tests, and medications will improve  Outcome: PROGRESSING AS EXPECTED      Problem: Skin Integrity  Goal: Risk for impaired skin integrity will decrease  Outcome: PROGRESSING AS EXPECTED

## 2018-07-04 NOTE — PROGRESS NOTES
Assumed care of pt. HE is a&ox4/denies pain. Discussed POC and all questions/concerns were addressed. Bed is locked in lowest position. Call light and personal belongings are within reach.

## 2018-07-04 NOTE — PROGRESS NOTES
Cardiology Progress Note               Author: Amira Ivey Date & Time created: 7/4/2018  12:25 PM     Interval History:  78-year-old male admitted on 7/3/18 with complaints of dizziness and lightheadedness.  Patient's blood pressure was noted to be 85/49 and heart rate was in the 50s.    Past medical history significant for atrial fibrillation, chronic anticoagulation with coumadin and diabetes.      Chief Complaint:  Dizziness and lightheadedness.    Consulted for bradycardia.    7/4/18: Patient resting in bed. Anxious to have his PPM procedure. No complaints at this time.     Monitor: Atrial fibrillation 72-83 with frequent PVCs and occasional PACs.    Labs:  CBC  Unremarkable  BMP Unremarkable    Trop 0.01    Review of Systems   Constitutional: Negative for malaise/fatigue and weight loss.   Respiratory: Negative for shortness of breath.    Cardiovascular: Negative for chest pain, palpitations, orthopnea, claudication, leg swelling and PND.   Neurological: Negative for dizziness, loss of consciousness and weakness.   All other systems reviewed and are negative.      Physical Exam   Constitutional: He is oriented to person, place, and time. He appears well-developed and well-nourished.   HENT:   Head: Normocephalic.   Eyes: EOM are normal.   Neck: No JVD present.   Cardiovascular: Normal rate, normal heart sounds and intact distal pulses.  An irregularly irregular rhythm present.   Pulses:       Radial pulses are 2+ on the right side, and 2+ on the left side.        Dorsalis pedis pulses are 2+ on the right side, and 2+ on the left side.   Pulmonary/Chest: Effort normal and breath sounds normal.   Abdominal: Soft. There is no tenderness.   Musculoskeletal: Normal range of motion. He exhibits no edema.   Neurological: He is alert and oriented to person, place, and time.   Skin: Skin is warm and dry.   Psychiatric: He has a normal mood and affect. His behavior is normal.   Nursing note and vitals  reviewed.      Hemodynamics:  Temp (24hrs), Av.3 °C (97.4 °F), Min:36.1 °C (97 °F), Max:36.6 °C (97.9 °F)  Temperature: 36.3 °C (97.3 °F)  Pulse  Av.7  Min: 50  Max: 115   Blood Pressure : 105/64     Respiratory:    Respiration: 18, Pulse Oximetry: 94 %           Fluids:     Weight: 85.9 kg (189 lb 6 oz)  GI/Nutrition:  Orders Placed This Encounter   Procedures   • Diet Order Consistent Carbohydrate, 2 Gram Sodium     Standing Status:   Standing     Number of Occurrences:   1     Order Specific Question:   Diet:     Answer:   Consistent Carbohydrate [4]     Order Specific Question:   Diet:     Answer:   2 Gram Sodium [7]   • DIET NPO     Standing Status:   Standing     Number of Occurrences:   8     Order Specific Question:   Restrict to:     Answer:   Sips with Medications [3]     Lab Results:  Recent Labs      18   0108   WBC  7.7  10.1   RBC  4.62*  4.61*   HEMOGLOBIN  14.1  14.3   HEMATOCRIT  42.4  42.5   MCV  91.8  92.2   MCH  30.5  31.0   MCHC  33.3*  33.6*   RDW  43.7  44.3   PLATELETCT  146*  154*   MPV  11.7  11.8     Recent Labs      18   0108   SODIUM  135  135   POTASSIUM  3.6  4.1   CHLORIDE  106  104   CO2  19*  23   GLUCOSE  168*  133*   BUN  21  20   CREATININE  0.98  1.00   CALCIUM  8.5  8.8     Recent Labs      18   APTT  24.8   INR  1.45*     Recent Labs      18   BNPBTYPENAT  252*     Recent Labs      18   0108   TROPONINI  <0.02  <0.01   BNPBTYPENAT  252*   --      TTE 16:  No prior study is available for comparison.   Technically difficult study.   Normal left ventricular chamber size.  Grossly normal regional wall motion.  Left ventricular ejection fraction is visually estimated to be 70%.  Trace mitral regurgitation.  Aortic sclerosis without stenosis.  Mild tricuspid regurgitation.  Dilated inferior vena cava without inspiratory collapse.  Estimated right ventricular systolic  pressure  is 35-40 mmHg.  The right ventricle was normal in size and function.        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   -Coumadin held for PPM on 7/5/18.  -Repeat INR in am.     Atrial Fibrillation:  -Rates controlled.  -Coumadin held for now for possible PPM.     HLD:  -LDL on 5/10/18 was 93.  -Repeat lipid panel in am.  -Continue Atorvastatin 10 mg qd.    Plan is for PPM placement tomorrow. Patient will be NPO an midnight tonight. We will continue to follow alongside you in the care of this patient. Please let us know if you have any questions or concerns.     Quality-Core Measures   Reviewed items::  EKG reviewed, Labs reviewed, Medications reviewed and Radiology images reviewed

## 2018-07-04 NOTE — PROGRESS NOTES
Assumed care of patient, bedside report received from Nicci. Updated on POC, call light within reach and fall precautions in place. Bed locked and in lowest position. Patient instructed to call for assistance before getting out of bed. All questions answered, no other needs at this time.

## 2018-07-04 NOTE — DISCHARGE PLANNING
Anticipated Discharge Disposition: Pending    Action: LSW received a copy of pt's DPOA via e-mail from pt's daughter Rita. LSW faxed a copy to HIM to scan into pt's chart and will place a printed copy in pt's paper chart.    Barriers to Discharge: None known.    Plan: No d/c planning needs at this time.

## 2018-07-05 ENCOUNTER — APPOINTMENT (OUTPATIENT)
Dept: RADIOLOGY | Facility: MEDICAL CENTER | Age: 78
DRG: 243 | End: 2018-07-05
Attending: INTERNAL MEDICINE
Payer: MEDICARE

## 2018-07-05 LAB
ANION GAP SERPL CALC-SCNC: 9 MMOL/L (ref 0–11.9)
BUN SERPL-MCNC: 13 MG/DL (ref 8–22)
CALCIUM SERPL-MCNC: 8.6 MG/DL (ref 8.5–10.5)
CHLORIDE SERPL-SCNC: 107 MMOL/L (ref 96–112)
CHOLEST SERPL-MCNC: 153 MG/DL (ref 100–199)
CO2 SERPL-SCNC: 22 MMOL/L (ref 20–33)
CREAT SERPL-MCNC: 0.74 MG/DL (ref 0.5–1.4)
EKG IMPRESSION: NORMAL
GLUCOSE BLD-MCNC: 104 MG/DL (ref 65–99)
GLUCOSE BLD-MCNC: 160 MG/DL (ref 65–99)
GLUCOSE BLD-MCNC: 183 MG/DL (ref 65–99)
GLUCOSE BLD-MCNC: 96 MG/DL (ref 65–99)
GLUCOSE SERPL-MCNC: 122 MG/DL (ref 65–99)
HDLC SERPL-MCNC: 30 MG/DL
INR PPP: 1.49 (ref 0.87–1.13)
LDLC SERPL CALC-MCNC: 87 MG/DL
POTASSIUM SERPL-SCNC: 3.9 MMOL/L (ref 3.6–5.5)
PROTHROMBIN TIME: 17.7 SEC (ref 12–14.6)
SODIUM SERPL-SCNC: 138 MMOL/L (ref 135–145)
TRIGL SERPL-MCNC: 182 MG/DL (ref 0–149)

## 2018-07-05 PROCEDURE — 80048 BASIC METABOLIC PNL TOTAL CA: CPT

## 2018-07-05 PROCEDURE — 305387 HCHG SUTURES

## 2018-07-05 PROCEDURE — 770020 HCHG ROOM/CARE - TELE (206)

## 2018-07-05 PROCEDURE — 304853 HCHG PPM TEST CABLE

## 2018-07-05 PROCEDURE — 700102 HCHG RX REV CODE 250 W/ 637 OVERRIDE(OP): Performed by: HOSPITALIST

## 2018-07-05 PROCEDURE — A9270 NON-COVERED ITEM OR SERVICE: HCPCS | Performed by: HOSPITALIST

## 2018-07-05 PROCEDURE — C1898 LEAD, PMKR, OTHER THAN TRANS: HCPCS

## 2018-07-05 PROCEDURE — 85610 PROTHROMBIN TIME: CPT

## 2018-07-05 PROCEDURE — 304952 HCHG R 2 PADS

## 2018-07-05 PROCEDURE — 02HK3JZ INSERTION OF PACEMAKER LEAD INTO RIGHT VENTRICLE, PERCUTANEOUS APPROACH: ICD-10-PCS | Performed by: INTERNAL MEDICINE

## 2018-07-05 PROCEDURE — 71045 X-RAY EXAM CHEST 1 VIEW: CPT

## 2018-07-05 PROCEDURE — 93005 ELECTROCARDIOGRAM TRACING: CPT | Performed by: INTERNAL MEDICINE

## 2018-07-05 PROCEDURE — 99231 SBSQ HOSP IP/OBS SF/LOW 25: CPT | Performed by: HOSPITALIST

## 2018-07-05 PROCEDURE — 82962 GLUCOSE BLOOD TEST: CPT | Mod: 91

## 2018-07-05 PROCEDURE — C1785 PMKR, DUAL, RATE-RESP: HCPCS

## 2018-07-05 PROCEDURE — 36415 COLL VENOUS BLD VENIPUNCTURE: CPT

## 2018-07-05 PROCEDURE — 99153 MOD SED SAME PHYS/QHP EA: CPT

## 2018-07-05 PROCEDURE — A9270 NON-COVERED ITEM OR SERVICE: HCPCS | Performed by: INTERNAL MEDICINE

## 2018-07-05 PROCEDURE — C1892 INTRO/SHEATH,FIXED,PEEL-AWAY: HCPCS

## 2018-07-05 PROCEDURE — 700102 HCHG RX REV CODE 250 W/ 637 OVERRIDE(OP): Performed by: INTERNAL MEDICINE

## 2018-07-05 PROCEDURE — 0JH606Z INSERTION OF PACEMAKER, DUAL CHAMBER INTO CHEST SUBCUTANEOUS TISSUE AND FASCIA, OPEN APPROACH: ICD-10-PCS | Performed by: INTERNAL MEDICINE

## 2018-07-05 PROCEDURE — 80061 LIPID PANEL: CPT

## 2018-07-05 PROCEDURE — 93010 ELECTROCARDIOGRAM REPORT: CPT | Performed by: INTERNAL MEDICINE

## 2018-07-05 PROCEDURE — 700105 HCHG RX REV CODE 258: Performed by: INTERNAL MEDICINE

## 2018-07-05 PROCEDURE — 02H63JZ INSERTION OF PACEMAKER LEAD INTO RIGHT ATRIUM, PERCUTANEOUS APPROACH: ICD-10-PCS | Performed by: INTERNAL MEDICINE

## 2018-07-05 PROCEDURE — 99152 MOD SED SAME PHYS/QHP 5/>YRS: CPT

## 2018-07-05 PROCEDURE — 700111 HCHG RX REV CODE 636 W/ 250 OVERRIDE (IP)

## 2018-07-05 PROCEDURE — 700111 HCHG RX REV CODE 636 W/ 250 OVERRIDE (IP): Performed by: INTERNAL MEDICINE

## 2018-07-05 PROCEDURE — 700105 HCHG RX REV CODE 258: Performed by: HOSPITALIST

## 2018-07-05 PROCEDURE — 33208 INSRT HEART PM ATRIAL & VENT: CPT

## 2018-07-05 RX ORDER — OXYCODONE HYDROCHLORIDE AND ACETAMINOPHEN 5; 325 MG/1; MG/1
1-2 TABLET ORAL EVERY 4 HOURS PRN
Status: DISCONTINUED | OUTPATIENT
Start: 2018-07-05 | End: 2018-07-07 | Stop reason: HOSPADM

## 2018-07-05 RX ORDER — WARFARIN SODIUM 3 MG/1
1.5 TABLET ORAL
Status: DISCONTINUED | OUTPATIENT
Start: 2018-07-05 | End: 2018-07-07 | Stop reason: HOSPADM

## 2018-07-05 RX ORDER — MIDAZOLAM HYDROCHLORIDE 1 MG/ML
INJECTION INTRAMUSCULAR; INTRAVENOUS
Status: COMPLETED
Start: 2018-07-05 | End: 2018-07-05

## 2018-07-05 RX ORDER — METOPROLOL TARTRATE 50 MG/1
50 TABLET, FILM COATED ORAL TWICE DAILY
Status: DISCONTINUED | OUTPATIENT
Start: 2018-07-05 | End: 2018-07-07 | Stop reason: HOSPADM

## 2018-07-05 RX ORDER — WARFARIN SODIUM 1 MG/1
1 TABLET ORAL
Status: DISCONTINUED | OUTPATIENT
Start: 2018-07-09 | End: 2018-07-07 | Stop reason: HOSPADM

## 2018-07-05 RX ADMIN — INSULIN HUMAN 2 UNITS: 100 INJECTION, SOLUTION PARENTERAL at 17:38

## 2018-07-05 RX ADMIN — ATORVASTATIN CALCIUM 10 MG: 10 TABLET, FILM COATED ORAL at 05:02

## 2018-07-05 RX ADMIN — VANCOMYCIN HYDROCHLORIDE 1.3 G: 1 INJECTION, POWDER, LYOPHILIZED, FOR SOLUTION INTRAVENOUS at 10:00

## 2018-07-05 RX ADMIN — INSULIN HUMAN 2 UNITS: 100 INJECTION, SOLUTION PARENTERAL at 20:47

## 2018-07-05 RX ADMIN — MIDAZOLAM 4 MG: 1 INJECTION INTRAMUSCULAR; INTRAVENOUS at 09:45

## 2018-07-05 RX ADMIN — METOPROLOL TARTRATE 50 MG: 50 TABLET ORAL at 21:39

## 2018-07-05 RX ADMIN — STANDARDIZED SENNA CONCENTRATE AND DOCUSATE SODIUM 2 TABLET: 8.6; 5 TABLET, FILM COATED ORAL at 05:01

## 2018-07-05 RX ADMIN — LIDOCAINE HYDROCHLORIDE 200 MG: 20 INJECTION, SOLUTION INTRAVENOUS at 09:45

## 2018-07-05 RX ADMIN — METOPROLOL TARTRATE 50 MG: 50 TABLET ORAL at 12:20

## 2018-07-05 RX ADMIN — SODIUM CHLORIDE: 9 INJECTION, SOLUTION INTRAVENOUS at 18:51

## 2018-07-05 RX ADMIN — VANCOMYCIN HYDROCHLORIDE 1000 MG: 100 INJECTION, POWDER, LYOPHILIZED, FOR SOLUTION INTRAVENOUS at 20:44

## 2018-07-05 RX ADMIN — WARFARIN SODIUM 1.5 MG: 3 TABLET ORAL at 17:35

## 2018-07-05 RX ADMIN — VANCOMYCIN HYDROCHLORIDE 1 G: 1 INJECTION, POWDER, LYOPHILIZED, FOR SOLUTION INTRAVENOUS at 10:15

## 2018-07-05 RX ADMIN — FENTANYL CITRATE 100 MCG: 50 INJECTION, SOLUTION INTRAMUSCULAR; INTRAVENOUS at 09:45

## 2018-07-05 RX ADMIN — SODIUM CHLORIDE: 9 INJECTION, SOLUTION INTRAVENOUS at 05:02

## 2018-07-05 RX ADMIN — QUETIAPINE FUMARATE 25 MG: 25 TABLET ORAL at 20:43

## 2018-07-05 RX ADMIN — STANDARDIZED SENNA CONCENTRATE AND DOCUSATE SODIUM 2 TABLET: 8.6; 5 TABLET, FILM COATED ORAL at 17:35

## 2018-07-05 ASSESSMENT — ENCOUNTER SYMPTOMS
PHOTOPHOBIA: 0
NECK PAIN: 0
PALPITATIONS: 0
DIZZINESS: 0
CHILLS: 0
DIARRHEA: 0
ORTHOPNEA: 0
BLURRED VISION: 0
FEVER: 0
ABDOMINAL PAIN: 0
COUGH: 0
EYE PAIN: 0
VOMITING: 0
HALLUCINATIONS: 0
HEMOPTYSIS: 0
DOUBLE VISION: 0
BACK PAIN: 0
DEPRESSION: 0
NAUSEA: 0
CLAUDICATION: 0
HEARTBURN: 0
DIAPHORESIS: 0
MYALGIAS: 0
WEIGHT LOSS: 0

## 2018-07-05 ASSESSMENT — CHA2DS2 SCORE
AGE 75 OR GREATER: YES
CHA2DS2 VASC SCORE: 3
CHF OR LEFT VENTRICULAR DYSFUNCTION: NO
DIABETES: YES
SEX: MALE
PRIOR STROKE OR TIA OR THROMBOEMBOLISM: NO
HYPERTENSION: NO
AGE 65 TO 74: NO
VASCULAR DISEASE: NO

## 2018-07-05 ASSESSMENT — LIFESTYLE VARIABLES: SUBSTANCE_ABUSE: 0

## 2018-07-05 ASSESSMENT — PAIN SCALES - GENERAL
PAINLEVEL_OUTOF10: 0

## 2018-07-05 NOTE — PROGRESS NOTES
Assumed care of patient, bedside report received from [facility]. Updated on POC, call light within reach and fall precautions in place. Bed locked and in lowest position. Patient instructed to call for assistance before getting out of bed. All questions answered, no other needs at this time.

## 2018-07-05 NOTE — PROGRESS NOTES
Monitor summary: SR/Afib/ burst of second degree type 1 non sustained. , frequent PACs.     Measurements: 0/10/0

## 2018-07-05 NOTE — PROGRESS NOTES
Amira FRITZ with cardiology notified of burst of second degree type 1 non-sustained. She stated she would notify Dr. Neil.

## 2018-07-05 NOTE — DISCHARGE PLANNING
Anticipated Discharge Disposition: D/C to Home ([facility])    Action: LSW received call from staff at [facility], which is the facility pt resides at. LSW informed by staff that they will be coming to do an on-site evaluation with pt in the morning to decide if they can take him back at time of d/c.     Barriers to Discharge: Clearance from group [place].    Plan: [facility] staff will assess pt in AM and d/c planning needs will be assessed at that time.

## 2018-07-05 NOTE — CARE PLAN
Problem: Infection  Goal: Will remain free from infection  Outcome: PROGRESSING AS EXPECTED  Patient will verbalize signs and symptoms of infection, interventions that can prevent infection, and when to report signs and symptoms to a healthcare provider.     Problem: Knowledge Deficit  Goal: Knowledge of disease process/condition, treatment plan, diagnostic tests, and medications will improve  Outcome: PROGRESSING AS EXPECTED  Knowledge of disease process, current condition, plan of care, medications, and diagnostics will improve.

## 2018-07-05 NOTE — PROGRESS NOTES
Inpatient Anticoagulation Service Note    Date: 7/5/2018  Reason for Anticoagulation: Atrial Fibrillation   [id number] VASc Score: 3    Hemoglobin Value: 14.3  Hematocrit Value: 42.5  Lab Platelet Value: (!) 154  Target INR: 2.0 to 3.0    INR from last 7 days     Date/Time INR Value    07/05/18 0513 (!)  1.49        Dose from last 7 days     Date/Time Dose (mg)    07/05/18 1100  1.5        Average Dose (mg):  (Home dose: 1mg M/Tu/W & 1.5mg AOD per medrec)  Significant Interactions: Statin  Bridge Therapy: No     Reversal Agent Administered: Not Applicable  Comments: Admit for symptomatic bradycardia. On warfarin PTA for a.fib. Held for PPM today, 7/5.  Post PPM- restart warfarin per cards. INR subtherapeutic but still elevated. Given recent surgery and admit sx - no bolus, restart home dose with no bridge. Home dose identified above. H/H appears stable with no indication of bleeding. DDI identified above. Pharmacy to [facility].     Plan:  Warfarin 1.5mg with INR check tomorrow  Education Material Provided?: No (Chronic tx)  Pharmacist suggested discharge dosing: Warfarin 1mg PO every Mon/Tues/Wed and 1.5mg on all other days with close f/u within 3 days         Kellie Rivera PharmD., BCPS

## 2018-07-05 NOTE — PROGRESS NOTES
Bedside report received from previous nurse regarding prior 12 hours.  POC reviewed with pt.  No complaints of pain.  A&Ox4.  Flat affect.  White board updated.  Pt verbalizes understanding.  Call light within reach.

## 2018-07-05 NOTE — PROGRESS NOTES
[facility]    Date of Service: 2018    Chief Complaint  78 y.o. male admitted 7/3/2018 with  Sinus pauses and bradycardia    Interval Problem Update  Afebrile    Known diabetic    No chest pain, sob or dizziness    For cardiac pacemaker today. It was postponed until today    Consultants/Specialty    cardiology    Disposition  home        Review of Systems   Constitutional: Negative for chills, diaphoresis, fever, malaise/fatigue and weight loss.   HENT: Negative for ear discharge, ear pain, hearing loss and tinnitus.    Eyes: Negative for blurred vision, double vision, photophobia and pain.   Respiratory: Negative for cough and hemoptysis.    Cardiovascular: Negative for chest pain, palpitations, orthopnea and claudication.   Gastrointestinal: Negative for abdominal pain, diarrhea, heartburn, nausea and vomiting.   Genitourinary: Negative for dysuria, frequency, hematuria and urgency.   Musculoskeletal: Negative for back pain, joint pain, myalgias and neck pain.   Neurological: Negative for dizziness.   Psychiatric/Behavioral: Negative for depression, hallucinations, substance abuse and suicidal ideas.   All other systems reviewed and are negative.     Physical Exam  Laboratory/Imaging   Hemodynamics  Temp (24hrs), Av.3 °C (97.3 °F), Min:36 °C (96.8 °F), Max:36.7 °C (98 °F)   Temperature: 36.1 °C (97 °F)  Pulse  Av.3  Min: 50  Max: 115    Blood Pressure : 102/70      Respiratory      Respiration: 16, Pulse Oximetry: 92 %             Fluids    Intake/Output Summary (Last 24 hours) at 18 1153  Last data filed at 18 0153   Gross per 24 hour   Intake                0 ml   Output              500 ml   Net             -500 ml       Nutrition  Orders Placed This Encounter   Procedures   • Diet Order Cardiac     Standing Status:   Standing     Number of Occurrences:   1     Order Specific Question:   Diet:     Answer:   Cardiac [6]     Physical Exam   Constitutional: He is  oriented to person, place, and time. No distress.   Eyes: Left eye exhibits no discharge.   Neck: No tracheal deviation present. No thyromegaly present.   Cardiovascular: Normal rate.  Exam reveals no gallop and no friction rub.    No murmur heard.  irregular   Pulmonary/Chest: No respiratory distress. He has no wheezes. He exhibits no tenderness.   Abdominal: He exhibits no distension. There is no tenderness. There is no guarding.   Musculoskeletal: He exhibits no edema, tenderness or deformity.   Neurological: He is alert and oriented to person, place, and time. No cranial nerve deficit. Coordination normal.   Skin: He is not diaphoretic.       Recent Labs      07/02/18 2251 07/03/18 0108   WBC  7.7  10.1   RBC  4.62*  4.61*   HEMOGLOBIN  14.1  14.3   HEMATOCRIT  42.4  42.5   MCV  91.8  92.2   MCH  30.5  31.0   MCHC  33.3*  33.6*   RDW  43.7  44.3   PLATELETCT  146*  154*   MPV  11.7  11.8     Recent Labs      07/02/18 2251 07/03/18 0108  07/05/18   0513   SODIUM  135  135  138   POTASSIUM  3.6  4.1  3.9   CHLORIDE  106  104  107   CO2  19*  23  22   GLUCOSE  168*  133*  122*   BUN  21  20  13   CREATININE  0.98  1.00  0.74   CALCIUM  8.5  8.8  8.6     Recent Labs      07/02/18 2251 07/05/18   0513   APTT  24.8   --    INR  1.45*  1.49*     Recent Labs      07/02/18 2251   BNPBTYPENAT  252*     Recent Labs      07/05/18   0513   TRIGLYCERIDE  182*   HDL  30*   LDL  87          Assessment/Plan     Symptomatic bradycardia- (present on admission)   Assessment & Plan    Resume b blocker, as per cardiology    Pacer today            Permanent atrial fibrillation (HCC)- (present on admission)   Assessment & Plan    Rate controll     lopressor resumed, as per cardiology    Hold coumadin for pacer. Resume after        Type 2 diabetes mellitus without complication, without long-term current use of insulin (HCC)- (present on admission)   Assessment & Plan    Hold metformin    fs with ssi        Dyslipidemia-  (present on admission)   Assessment & Plan    Cont lipitor        Overweight (BMI 25.0-29.9)- (present on admission)   Assessment & Plan    Body mass index is 28.49 kg/m².          Heart block   Assessment & Plan    First-degree AV block on EKG.  Continue to monitor with telemetry          Quality-Core Measures   Cabello catheter::  No Cabello  DVT prophylaxis - mechanical:  SCDs

## 2018-07-05 NOTE — OP REPORT
Electrophysiology Procedure Note  [facility]    PROCEDURE PERFORMED: DDDR PPM, moderate sedation administered by RN and supervised by physician    : Nate Fuentes MD    ASSISTANT: none    ANESTHESIA: Moderate sedation,  start time 10:00 am, stop time 10:34 am  the moderate sedation document has been reviewed, signed and scanned into media     EBL: 30 cc    SPECIMENS: None    INDICATION: Tachy renate    PRE PROCEDURE ECG: atrial flutter/atrial tachycardia    POST PROCEDURE ECG: atrial flutter/atrial tachycardia with intermittent V pacing     COMPLICATIONS:  none     DESCRIPTION OF PROCEDURE:  After informed written consent, the patient was brought to the electrophysiology lab in the fasting, unsedated state. The patient was prepped and draped in the usual sterile fashion. The procedure was performed under moderate sedation with local anesthetic.   A left infraclavicular incision was made with a scalpel and the pectoral device pocket was created using a combination of blunt dissection and electrocautery. The modified Seldinger technique was used to gain access to the left axillary vein. A peel-away hemostasis sheath was placed in the vein. Under fluoroscopic guidance, the pacemaker leads were introduced into the heart. The ventricular lead was advanced to the RVOT and then lowered into position at the RV apex. The atrial lead was positioned in the Right atrial appendage. The leads were tested and had satisfactory sensing and pacing parameters. High output ventricular pacing did not produce extracardiac stimulation. The leads were sutured to the underlying pectoral muscle with interrupted non absorbable suture over a silastic suture sleeve. The device pocket was irrigated with antibiotic solution, inspected, and no bleeding was seen. The leads were connected to the DDDR PPM pulse generator and the device was inserted into the pocket The wound was closed with three layers of absorbable  sutures.  I personally supervised the administration of moderate sedation by the RN and observed the level of consciousness and physiologic status throughout the procedure.   Following recovery from sedation, the patient was transferred to a monitored bed in good condition.     IMPLANTED DEVICE INFORMATION:  Pulse generator is a Medtronic model A2DR01  Serial # [id number]    LEAD INFORMATION:  1)Right atrial lead is a Medtonic model # 5076-52 , serial # [id number] ,P wave 2 millivolts, threshold 1.5 Volts, pacing impedance 1200 Ohms.  2)Right ventricular lead is a Medtronic model # 5076-58 , serial # [id number] ,R wave 9 millivolts, threshold 0.9 Volts, pacing impedance 928 Ohms.    DEVICE PROGRAMMING:  DDIR    FLUOROSCOPY TIME: 2.1 min    IMPRESSIONS:  1. Successful Dual chamber PPM implantation    RECOMMENDATIONS:  1. Transfer to monitored bed  2. PA and lateral chest x-ray  3. Device interrogation prior to hospital discharge  4. Followup in device clinic

## 2018-07-06 ENCOUNTER — APPOINTMENT (OUTPATIENT)
Dept: RADIOLOGY | Facility: MEDICAL CENTER | Age: 78
DRG: 243 | End: 2018-07-06
Attending: INTERNAL MEDICINE
Payer: MEDICARE

## 2018-07-06 PROBLEM — R53.81 DEBILITY: Status: ACTIVE | Noted: 2018-07-06

## 2018-07-06 PROBLEM — I45.9 HEART BLOCK: Status: RESOLVED | Noted: 2018-07-03 | Resolved: 2018-07-06

## 2018-07-06 PROBLEM — J81.0 ACUTE PULMONARY EDEMA (HCC): Status: ACTIVE | Noted: 2018-07-06

## 2018-07-06 PROBLEM — R00.1 SYMPTOMATIC BRADYCARDIA: Status: RESOLVED | Noted: 2018-07-03 | Resolved: 2018-07-06

## 2018-07-06 LAB
EKG IMPRESSION: NORMAL
GLUCOSE BLD-MCNC: 142 MG/DL (ref 65–99)
GLUCOSE BLD-MCNC: 205 MG/DL (ref 65–99)
GLUCOSE BLD-MCNC: 243 MG/DL (ref 65–99)
GLUCOSE BLD-MCNC: 99 MG/DL (ref 65–99)
INR PPP: 1.76 (ref 0.87–1.13)
PROTHROMBIN TIME: 20.2 SEC (ref 12–14.6)

## 2018-07-06 PROCEDURE — G8978 MOBILITY CURRENT STATUS: HCPCS | Mod: CM

## 2018-07-06 PROCEDURE — 36415 COLL VENOUS BLD VENIPUNCTURE: CPT

## 2018-07-06 PROCEDURE — 700102 HCHG RX REV CODE 250 W/ 637 OVERRIDE(OP): Performed by: HOSPITALIST

## 2018-07-06 PROCEDURE — G8987 SELF CARE CURRENT STATUS: HCPCS | Mod: CM

## 2018-07-06 PROCEDURE — 700102 HCHG RX REV CODE 250 W/ 637 OVERRIDE(OP): Performed by: INTERNAL MEDICINE

## 2018-07-06 PROCEDURE — 97166 OT EVAL MOD COMPLEX 45 MIN: CPT

## 2018-07-06 PROCEDURE — A9270 NON-COVERED ITEM OR SERVICE: HCPCS | Performed by: INTERNAL MEDICINE

## 2018-07-06 PROCEDURE — A9270 NON-COVERED ITEM OR SERVICE: HCPCS | Performed by: HOSPITALIST

## 2018-07-06 PROCEDURE — 770020 HCHG ROOM/CARE - TELE (206)

## 2018-07-06 PROCEDURE — 82962 GLUCOSE BLOOD TEST: CPT | Mod: 91

## 2018-07-06 PROCEDURE — 99232 SBSQ HOSP IP/OBS MODERATE 35: CPT | Performed by: HOSPITALIST

## 2018-07-06 PROCEDURE — G8979 MOBILITY GOAL STATUS: HCPCS | Mod: CJ

## 2018-07-06 PROCEDURE — 85610 PROTHROMBIN TIME: CPT

## 2018-07-06 PROCEDURE — G8988 SELF CARE GOAL STATUS: HCPCS | Mod: CJ

## 2018-07-06 PROCEDURE — 93010 ELECTROCARDIOGRAM REPORT: CPT | Performed by: INTERNAL MEDICINE

## 2018-07-06 PROCEDURE — 97162 PT EVAL MOD COMPLEX 30 MIN: CPT

## 2018-07-06 PROCEDURE — 93005 ELECTROCARDIOGRAM TRACING: CPT | Performed by: INTERNAL MEDICINE

## 2018-07-06 PROCEDURE — 71045 X-RAY EXAM CHEST 1 VIEW: CPT

## 2018-07-06 RX ORDER — FUROSEMIDE 20 MG/1
20 TABLET ORAL ONCE
Status: COMPLETED | OUTPATIENT
Start: 2018-07-06 | End: 2018-07-06

## 2018-07-06 RX ORDER — METOPROLOL TARTRATE 50 MG/1
50 TABLET, FILM COATED ORAL 2 TIMES DAILY
Qty: 60 TAB | Refills: 3 | Status: SHIPPED | OUTPATIENT
Start: 2018-07-06

## 2018-07-06 RX ADMIN — ATORVASTATIN CALCIUM 10 MG: 10 TABLET, FILM COATED ORAL at 04:58

## 2018-07-06 RX ADMIN — INSULIN HUMAN 3 UNITS: 100 INJECTION, SOLUTION PARENTERAL at 11:43

## 2018-07-06 RX ADMIN — INSULIN HUMAN 3 UNITS: 100 INJECTION, SOLUTION PARENTERAL at 20:56

## 2018-07-06 RX ADMIN — FUROSEMIDE 20 MG: 20 TABLET ORAL at 11:38

## 2018-07-06 RX ADMIN — WARFARIN SODIUM 1.5 MG: 3 TABLET ORAL at 18:33

## 2018-07-06 RX ADMIN — METOPROLOL TARTRATE 50 MG: 50 TABLET ORAL at 18:33

## 2018-07-06 RX ADMIN — METOPROLOL TARTRATE 50 MG: 50 TABLET ORAL at 04:56

## 2018-07-06 RX ADMIN — QUETIAPINE FUMARATE 25 MG: 25 TABLET ORAL at 18:33

## 2018-07-06 ASSESSMENT — LIFESTYLE VARIABLES: SUBSTANCE_ABUSE: 0

## 2018-07-06 ASSESSMENT — PAIN SCALES - GENERAL
PAINLEVEL_OUTOF10: 0
PAINLEVEL_OUTOF10: 2

## 2018-07-06 ASSESSMENT — GAIT ASSESSMENTS
ASSISTIVE DEVICE: HAND HELD ASSIST
GAIT LEVEL OF ASSIST: UNABLE TO PARTICIPATE

## 2018-07-06 ASSESSMENT — ENCOUNTER SYMPTOMS
PALPITATIONS: 0
CHILLS: 0
MYALGIAS: 0
HALLUCINATIONS: 0
WEIGHT LOSS: 0
NECK PAIN: 0
DIZZINESS: 0
NAUSEA: 0
DOUBLE VISION: 0
FEVER: 0
ABDOMINAL PAIN: 0
HEMOPTYSIS: 0
COUGH: 0
HEARTBURN: 0
BLURRED VISION: 0
PHOTOPHOBIA: 0
DEPRESSION: 0
EYE PAIN: 0
CLAUDICATION: 0
ORTHOPNEA: 0
DIARRHEA: 0
VOMITING: 0
BACK PAIN: 0
WEAKNESS: 1
DIAPHORESIS: 0

## 2018-07-06 ASSESSMENT — COGNITIVE AND FUNCTIONAL STATUS - GENERAL
EATING MEALS: A LITTLE
WALKING IN HOSPITAL ROOM: TOTAL
MOVING TO AND FROM BED TO CHAIR: UNABLE
DRESSING REGULAR UPPER BODY CLOTHING: A LITTLE
SUGGESTED CMS G CODE MODIFIER DAILY ACTIVITY: CK
TOILETING: A LOT
HELP NEEDED FOR BATHING: A LOT
SUGGESTED CMS G CODE MODIFIER MOBILITY: CM
TURNING FROM BACK TO SIDE WHILE IN FLAT BAD: UNABLE
MOBILITY SCORE: 7
CLIMB 3 TO 5 STEPS WITH RAILING: TOTAL
PERSONAL GROOMING: A LITTLE
STANDING UP FROM CHAIR USING ARMS: A LOT
DAILY ACTIVITIY SCORE: 15
DRESSING REGULAR LOWER BODY CLOTHING: A LOT
MOVING FROM LYING ON BACK TO SITTING ON SIDE OF FLAT BED: UNABLE

## 2018-07-06 ASSESSMENT — ACTIVITIES OF DAILY LIVING (ADL): TOILETING: REQUIRES ASSIST

## 2018-07-06 NOTE — PROGRESS NOTES
Cardiology Progress Note               Author: Audra Martinez Date & Time created: 2018  9:19 AM     Interval History:  Patient seen on EPS rounds.  Underwent permanent pacemaker insertion yesterday for symptomatic bradycardia/SSS  H/O atrial arrhythmias.  DEVICE INFORMATION:  Pulse generator is a Medtronic model A2DR01  Serial # [id number]     LEAD INFORMATION:  1)Right atrial lead is a Medtonic model # 5076-52 , serial # [id number] ,P wave 2 millivolts, threshold 1.5 Volts, pacing impedance 1200 Ohms.  2)Right ventricular lead is a Medtronic model # 5076-58 , serial # [id number] ,R wave 9 millivolts, threshold 0.9 Volts, pacing impedance 928 Ohms.     DEVICE PROGRAMMING:  DDIR    CXR no late ptx.  Device interrogation intact:  RA p waves 1.8,513, 0.5V  RV R waves 5.5, 608, 0.5V    Chief Complaint:  Dizzy  bradycardia    ROS    Physical Exam    Hemodynamics:  Temp (24hrs), Av.4 °C (97.5 °F), Min:36.1 °C (97 °F), Max:36.9 °C (98.4 °F)  Temperature: 36.6 °C (97.9 °F)  Pulse  Av  Min: 50  Max: 115   Blood Pressure : 120/84     Respiratory:    Respiration: 16, Pulse Oximetry: 96 %           Fluids:     Weight: 88.1 kg (194 lb 3.6 oz)  GI/Nutrition:  Orders Placed This Encounter   Procedures   • Diet Order Cardiac     Standing Status:   Standing     Number of Occurrences:   1     Order Specific Question:   Diet:     Answer:   Cardiac [6]     Lab Results:      Recent Labs      18   0513   SODIUM  138   POTASSIUM  3.9   CHLORIDE  107   CO2  22   GLUCOSE  122*   BUN  13   CREATININE  0.74   CALCIUM  8.6     Recent Labs      18   0513  18   0202   INR  1.49*  1.76*             Recent Labs      18   0513   TRIGLYCERIDE  182*   HDL  30*   LDL  87         Medical Decision Making, by Problem:  Active Hospital Problems    Diagnosis   • Permanent atrial fibrillation (HCC) [I48.2]   • Dyslipidemia [E78.5]   • Type 2 diabetes mellitus without complication, without long-term current use of  insulin (HCC) [E11.9]   • Overweight (BMI 25.0-29.9) [E66.3]   • 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  Device interrogation intact.  CXr no late Ptx.  Arm restrictions reviewed with patient.  Arm immobilizer ordered.  EPS will sign off thank you for this consultation.    Quality-Core Measures

## 2018-07-06 NOTE — PROGRESS NOTES
Assumed care of pt, beside report received from RUPERTO Metcalf. Updated on POC, call light within reach all fall precautions within place. Bed locked and lowered. Pt instructed to call for assistance before getting up. All questions answered, no other needs at this time.

## 2018-07-06 NOTE — THERAPY
"Physical Therapy Evaluation completed.   Bed Mobility:  Supine to Sit: Moderate Assist  Transfers: Sit to Stand: Maximal Assist  Gait: Level Of Assist: Unable to Participate with hand held assist       Plan of Care: Will benefit from Physical Therapy 3 times per week  Discharge Recommendations: Equipment: Will Continue to Assess for Equipment Needs.     Mr. Odell is a 79 y/o male who presents to acute secondary to symptomatic bradycardia s/p pacer insertion. He presents with lower extremity weakness, gross motor coordination deficits, and dynamic balance impairments that result in functional mobility far below his baseline. Strong posterior lean in sitting and standing. Balance impairments resulted in inability to weight shift while standing and inability to ambulate. Attempted to march in place, however resulted in further posterior lean. Despite his baseline cognitive deficits pt was compliant with pacer precautions when instructed. As he is far below his baseline level of mobility recommend post acute rehab prior to discharge back to [facility]. He would benefit from continued acute physical therapy services to improve his mobility and decrease risk for falls.     See \"Rehab Therapy-Acute\" Patient Summary Report for complete documentation.     "

## 2018-07-06 NOTE — DISCHARGE PLANNING
Received Choice form at 8700  Agency/Facility Name: [facility]  Referral sent per Choice form @ 8303

## 2018-07-06 NOTE — DISCHARGE PLANNING
Received Transport Form @ 1724  Spoke to Nicolas @ [facility]    Transport is scheduled for 1000 @T803/1 going to [address] [place] [address] DEE Turk

## 2018-07-06 NOTE — PROGRESS NOTES
Monitor summary: paced, afib, and in and out of second degree type 1.     Measurements: 0/.06/0

## 2018-07-06 NOTE — THERAPY
"Occupational Therapy Evaluation completed.   Functional Status: Pt is a 77 y/o male admitted with symptomatic bradycardia, s/p pacemaker placement. Reviewed PPM precautions with patient and his daugther. He is currently requiring min-modA for bed mobility, pt needing cues not to use LUE. Poor sitting balance EOB with posterior lean, needing freqent cues to correct. MaxA for sit<>stand with HHA, posterior lean still present. He was unable to weight shift without losing his balance. he is currently needing max A for [facility]. MaxA to don/doff immobilizer. He is limited by weakness, fatigue, impaired balance which impacts independence in ADLs and functional mobility.   Plan of Care: Will benefit from Occupational Therapy 3 times per week  Discharge Recommendations:  Equipment: Will Continue to Assess for Equipment Needs. Discharge to a transitional care facility for continued skilled therapy services.    See \"Rehab Therapy-Acute\" Patient Summary Report for complete documentation.    "

## 2018-07-06 NOTE — PROGRESS NOTES
Bedside report received from previous nurse regarding prior 12 hours.  A&Ox4.  No complaints of pain.  White board updated.  Call light within reach.

## 2018-07-06 NOTE — DISCHARGE PLANNING
Anticipated Discharge Disposition: D/C to SNF    Action: LSW informed by [facility] Carolyn that pt has been accepted to [facility]. LSW completed a transport request and PCS form and sent to [facility] for processing.    Barriers to Discharge: None.    Plan: LSW will print transfer packet and fill out COBRA on 7/7/18.

## 2018-07-06 NOTE — CARE PLAN
Problem: Infection  Goal: Will remain free from infection  Outcome: PROGRESSING AS EXPECTED  Patient will verbalize signs and symptoms of infection, interventions that can prevent infection, and when to notify a healthcare provider regarding signs and symptoms of infection.     Problem: Knowledge Deficit  Goal: Knowledge of disease process/condition, treatment plan, diagnostic tests, and medications will improve  Outcome: PROGRESSING AS EXPECTED  Knowledge of disease process, current condition, plan of care, medications, and diagnostics will improve. Patient will verbalize precautions that need to be done to protect pacemaker.

## 2018-07-06 NOTE — DISCHARGE PLANNING
Anticipated Discharge Disposition: D/C to SNF    Action: LSW informed by AUGUSTA Conway that pt will benefit from SNF at time of d/c. Pt is weak and lives in an assisted living facility, but they cannot meet the physical needs he has at this time. LSW met with pt's daughter and Rita GOLDSTEIN, and pt about referral. They would like [facility] SNF. LSW explained process, both parties acknowledged understanding.     Barriers to Discharge: None.    Plan: Await decision from [facility].

## 2018-07-06 NOTE — PROGRESS NOTES
[facility] Hospitalist Progress Note    Date of Service: 2018    Chief Complaint  78 y.o. male admitted 7/3/2018 with  Sinus pauses and bradycardia    Interval Problem Update  No chest or sob    Afebrile    Pod#1 cardiac pacemaker    Post op cxr shows some mild pulmonary edema    Patient is weak    Consultants/Specialty    cardiology    Disposition  home        Review of Systems   Constitutional: Negative for chills, diaphoresis, fever, malaise/fatigue and weight loss.   HENT: Negative for ear discharge, ear pain, hearing loss and tinnitus.    Eyes: Negative for blurred vision, double vision, photophobia and pain.   Respiratory: Negative for cough and hemoptysis.    Cardiovascular: Negative for chest pain, palpitations, orthopnea and claudication.   Gastrointestinal: Negative for abdominal pain, diarrhea, heartburn, nausea and vomiting.   Genitourinary: Negative for dysuria, frequency, hematuria and urgency.   Musculoskeletal: Negative for back pain, joint pain, myalgias and neck pain.   Neurological: Positive for weakness. Negative for dizziness.   Psychiatric/Behavioral: Negative for depression, hallucinations, substance abuse and suicidal ideas.   All other systems reviewed and are negative.     Physical Exam  Laboratory/Imaging   Hemodynamics  Temp (24hrs), Av.6 °C (97.8 °F), Min:36.3 °C (97.4 °F), Max:36.9 °C (98.4 °F)   Temperature: 36.4 °C (97.6 °F)  Pulse  Av.7  Min: 50  Max: 115    Blood Pressure : (!) 97/61 (Nurse notified.)      Respiratory      Respiration: 18, Pulse Oximetry: 95 %             Fluids    Intake/Output Summary (Last 24 hours) at 18 1436  Last data filed at 18 1256   Gross per 24 hour   Intake              360 ml   Output             2950 ml   Net            -2590 ml       Nutrition  Orders Placed This Encounter   Procedures   • Diet Order Cardiac, Diabetic     Standing Status:   Standing     Number of Occurrences:   1     Order Specific Question:   Diet:     Answer:    Cardiac [6]     Order Specific Question:   Diet:     Answer:   Diabetic [3]     Physical Exam   Constitutional: He is oriented to person, place, and time. No distress.   HENT:   Head: Atraumatic.   Eyes: Left eye exhibits no discharge.   Neck: No tracheal deviation present. No thyromegaly present.   Cardiovascular: Normal rate.  Exam reveals no gallop and no friction rub.    No murmur heard.  irregular   Pulmonary/Chest: No respiratory distress. He has no wheezes. He exhibits no tenderness.   Abdominal: He exhibits no distension. There is no tenderness. There is no guarding.   Musculoskeletal: He exhibits no edema, tenderness or deformity.   Neurological: He is alert and oriented to person, place, and time. No cranial nerve deficit. Coordination normal.   Skin: He is not diaphoretic.           Recent Labs      07/05/18   0513   SODIUM  138   POTASSIUM  3.9   CHLORIDE  107   CO2  22   GLUCOSE  122*   BUN  13   CREATININE  0.74   CALCIUM  8.6     Recent Labs      07/05/18   0513  07/06/18   0202   INR  1.49*  1.76*         Recent Labs      07/05/18   0513   TRIGLYCERIDE  182*   HDL  30*   LDL  87          Assessment/Plan     Symptomatic bradycardia- (present on admission)   Assessment & Plan    b blocker, as per cardiology    Pacer pod #1            Permanent atrial fibrillation (HCC)- (present on admission)   Assessment & Plan    Rate controll     lopressor resumed, as per cardiology    Cont coumadin need inr 2-3        Debility- (present on admission)   Assessment & Plan    snf recommended    [facility] referral made        Acute pulmonary edema (HCC)   Assessment & Plan    Lasix 20mg po x 1        Type 2 diabetes mellitus without complication, without long-term current use of insulin (HCC)- (present on admission)   Assessment & Plan    Hold metformin    fs with ssi        Dyslipidemia- (present on admission)   Assessment & Plan    Cont lipitor        Overweight (BMI 25.0-29.9)- (present on admission)   Assessment &  Plan    Body mass index is 28.49 kg/m².          Heart block- (present on admission)   Assessment & Plan    First-degree AV block on EKG.  Continue to monitor with telemetry          Quality-Core Measures   Cabello catheter::  No Cabello  DVT prophylaxis pharmacological::  Warfarin (Coumadin)  DVT prophylaxis - mechanical:  SCDs

## 2018-07-06 NOTE — PROGRESS NOTES
Inpatient Anticoagulation Service Note    Date: 7/6/2018  Reason for Anticoagulation: Atrial Fibrillation   [id number] VASc Score: 3    Hemoglobin Value: 14.3  Hematocrit Value: 42.5  Lab Platelet Value: (!) 154  Target INR: 2.0 to 3.0    INR from last 7 days     Date/Time INR Value    07/06/18 0202 (!)  1.76    07/05/18 0513 (!)  1.49        Dose from last 7 days     Date/Time Dose (mg)    07/06/18 1300  1.5    07/05/18 1100  1.5        Average Dose (mg):  (Home dose: 1mg M/Tu/W & 1.5mg AOD per medrec)  Significant Interactions: Statin  Bridge Therapy: No     Reversal Agent Administered: Not Applicable    Comments:   · Admit for symptomatic bradycardia. On warfarin PTA for a.fib. Home dose identified above. H/H appears stable with no indication of bleeding. DDI identified above. Pharmacy to [facility].   · INR below goal w/ increasing trend.    Plan:    · Warfarin 1.5mg po tonight with INR check tomorrow    Education Material Provided?: No (Chronic tx)  Pharmacist suggested discharge dosing: Warfarin 1mg PO every Mon/Tues/Wed and 1.5mg on all other days with close f/u within 3 days     Don Hussein, PharmD, BCPS

## 2018-07-07 VITALS
WEIGHT: 190.92 LBS | BODY MASS INDEX: 28.94 KG/M2 | HEIGHT: 68 IN | TEMPERATURE: 97.4 F | DIASTOLIC BLOOD PRESSURE: 92 MMHG | RESPIRATION RATE: 14 BRPM | SYSTOLIC BLOOD PRESSURE: 149 MMHG | HEART RATE: 73 BPM | OXYGEN SATURATION: 96 %

## 2018-07-07 PROBLEM — J81.0 ACUTE PULMONARY EDEMA (HCC): Status: RESOLVED | Noted: 2018-07-06 | Resolved: 2018-07-07

## 2018-07-07 LAB
GLUCOSE BLD-MCNC: 109 MG/DL (ref 65–99)
INR PPP: 1.74 (ref 0.87–1.13)
PROTHROMBIN TIME: 20 SEC (ref 12–14.6)

## 2018-07-07 PROCEDURE — A9270 NON-COVERED ITEM OR SERVICE: HCPCS | Performed by: HOSPITALIST

## 2018-07-07 PROCEDURE — 82962 GLUCOSE BLOOD TEST: CPT

## 2018-07-07 PROCEDURE — 99239 HOSP IP/OBS DSCHRG MGMT >30: CPT | Performed by: HOSPITALIST

## 2018-07-07 PROCEDURE — 85610 PROTHROMBIN TIME: CPT

## 2018-07-07 PROCEDURE — A9270 NON-COVERED ITEM OR SERVICE: HCPCS | Performed by: INTERNAL MEDICINE

## 2018-07-07 PROCEDURE — 36415 COLL VENOUS BLD VENIPUNCTURE: CPT

## 2018-07-07 PROCEDURE — 700102 HCHG RX REV CODE 250 W/ 637 OVERRIDE(OP): Performed by: HOSPITALIST

## 2018-07-07 PROCEDURE — 700102 HCHG RX REV CODE 250 W/ 637 OVERRIDE(OP): Performed by: INTERNAL MEDICINE

## 2018-07-07 RX ADMIN — ATORVASTATIN CALCIUM 10 MG: 10 TABLET, FILM COATED ORAL at 06:10

## 2018-07-07 RX ADMIN — METOPROLOL TARTRATE 50 MG: 50 TABLET ORAL at 06:10

## 2018-07-07 ASSESSMENT — PAIN SCALES - GENERAL
PAINLEVEL_OUTOF10: 0
PAINLEVEL_OUTOF10: 5

## 2018-07-07 NOTE — DISCHARGE SUMMARY
Discharge Summary    CHIEF COMPLAINT ON ADMISSION  Chief Complaint   Patient presents with   • Dizziness     Pt was dizzy earlier today and was taken to [facility]. on transport pt's NP was 85/49. Was given fluid and improved. At ED pt had bradycardia. Was transfered to [facility] for cardiac consult.    • Bradycardia       Reason for Admission  EMS     CODE STATUS  Full Code    HPI & HOSPITAL COURSE  78 y.o. male with history of atrial fibrillation with rate controlled, diabetes mellitus on oral medication regimen, was in his usual state of health until the day prior to admission.  He was at his home, doing his normal activities, when he had the onset of lightheadedness, this was associated with diaphoresis, he reports that the onset was sudden with no warning.  He reports that the lightheadedness lasted about 45 minutes and resolved on its own.  He presented to the emergency department for further evaluation.  He was initially taken to [facility], however he was transferred here for need of cardiology    He was admitted and referred to cardiology. He had a permanent cardiac pacemaker placed for symptomatic bradycardia on 7/5. He was seen by pt and ot. He was deemed to high level for his assisted level and referred to SNF      He has hx of diabetes mellitus type 2 and was resume on his metformin    He has hx of atrial fibrillation and was rsume on his coumain. Maintain an inr of 2-3    Therefore, he is discharged in good and stable condition to skilled nursing facility.    The patient met 2-midnight criteria for an inpatient stay at the time of discharge.      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 [facility]   7/18/2018 2:45 PM PACER CHECK-[facility] None   8/16/2018 8:40 AM [facility].D[facility]  [address]  [place] [address]  [phone]          MEDICATIONS ON DISCHARGE     Medication List      CHANGE how you take these medications      Instructions   metoprolol 50 MG Tabs  What changed:  · medication strength  · how much to take  Commonly known as:  LOPRESSOR   Take 1 Tab by mouth 2 times a day.  Dose:  50 mg        CONTINUE taking these medications      Instructions   atorvastatin 10 MG Tabs  Commonly known as:  LIPITOR   Take 10 mg by mouth every day.  Dose:  10 mg     Calcium 600 + D 600-400 MG-UNIT Tabs  Generic drug:  Calcium Carbonate-Vitamin D   Take 1 Tab by mouth every day.  Dose:  1 Tab     metFORMIN 500 MG Tabs  Commonly known as:  GLUCOPHAGE   Take 500 mg by mouth 2 times a day, with meals.  Dose:  500 mg     mirtazapine 15 MG Tabs  Commonly known as:  REMERON   Take 7.5 mg by mouth every evening.  Dose:  7.5 mg     QUEtiapine 25 MG Tabs  Commonly known as:  SEROQUEL   Take 25 mg by mouth every bedtime.  Dose:  25 mg     warfarin 1 MG Tabs  Commonly known as:  COUMADIN   Take 1-1.5 mg by mouth. Pt takes: 1MG on Mon, Tue, and Wed 1.5MG on Thur, Fri, Sat and Sun  Dose:  1-1.5 mg            Allergies  No Known Allergies    DIET  Orders Placed This Encounter   Procedures   • Diet Order Cardiac, Diabetic     Standing Status:   Standing     Number of Occurrences:   1      Order Specific Question:   Diet:     Answer:   Cardiac [6]     Order Specific Question:   Diet:     Answer:   Diabetic [3]       ACTIVITY  As tolerated.  Weight bearing as tolerated    LINES, DRAINS, AND WOUNDS  This is an automated list. Peripheral IVs will be removed prior to discharge.  PIV Group Left Forearm 18g Flexible Catheter;Saline Lock (Active)       PIV Group Left Forearm 18g Flexible Catheter (Active)   Line Secured Taped;Transparent 7/6/2018  8:00 PM   Site Condition / Description Assessed;Patent;Clean;Dry;Intact 7/6/2018  8:00 PM   Dressing Type / Description Transparent;Clean;Dry;Intact 7/6/2018  8:00 PM   Dressing Status Observed 7/6/2018  8:00 PM   Saline Locked Yes 7/6/2018  8:00 PM   Infiltration Grading Used by [facility] 0 7/6/2018  8:00 PM   Phlebitis Scale (Used by [facility]) 0 7/6/2018  8:00 PM                     MENTAL STATUS ON TRANSFER             CONSULTATIONS  Dr arredondo cardiology    PROCEDURES  Electrophysiology Procedure Note  [facility]     PROCEDURE PERFORMED: DDDR PPM, moderate sedation administered by RN and supervised by physician     : Nate Arredondo MD     ASSISTANT: none     ANESTHESIA: Moderate sedation,  start time 10:00 am, stop time 10:34 am  the moderate sedation document has been reviewed, signed and scanned into media      EBL: 30 cc     SPECIMENS: None     INDICATION: Tachy renate     PRE PROCEDURE ECG: atrial flutter/atrial tachycardia     POST PROCEDURE ECG: atrial flutter/atrial tachycardia with intermittent V pacing     COMPLICATIONS:  none      DESCRIPTION OF PROCEDURE:  After informed written consent, the patient was brought to the electrophysiology lab in the fasting, unsedated state. The patient was prepped and draped in the usual sterile fashion. The procedure was performed under moderate sedation with local anesthetic.   A left infraclavicular incision was made with a scalpel and the pectoral device pocket was created using a  combination of blunt dissection and electrocautery. The modified Seldinger technique was used to gain access to the left axillary vein. A peel-away hemostasis sheath was placed in the vein. Under fluoroscopic guidance, the pacemaker leads were introduced into the heart. The ventricular lead was advanced to the RVOT and then lowered into position at the RV apex. The atrial lead was positioned in the Right atrial appendage. The leads were tested and had satisfactory sensing and pacing parameters. High output ventricular pacing did not produce extracardiac stimulation. The leads were sutured to the underlying pectoral muscle with interrupted non absorbable suture over a silastic suture sleeve. The device pocket was irrigated with antibiotic solution, inspected, and no bleeding was seen. The leads were connected to the DDDR PPM pulse generator and the device was inserted into the pocket The wound was closed with three layers of absorbable sutures.  I personally supervised the administration of moderate sedation by the RN and observed the level of consciousness and physiologic status throughout the procedure.   Following recovery from sedation, the patient was transferred to a monitored bed in good condition.      IMPLANTED DEVICE INFORMATION:  Pulse generator is a Medtronic model A2DR01  Serial # [id number]     LEAD INFORMATION:  1)Right atrial lead is a Medtonic model # 5076-52 , serial # [id number] ,P wave 2 millivolts, threshold 1.5 Volts, pacing impedance 1200 Ohms.  2)Right ventricular lead is a Medtronic model # 5076-58 , serial # [id number] ,R wave 9 millivolts, threshold 0.9 Volts, pacing impedance 928 Ohms.     DEVICE PROGRAMMING:  DDIR     FLUOROSCOPY TIME: 2.1 min     IMPRESSIONS:  1. Successful Dual chamber PPM implantation     RECOMMENDATIONS:  1. Transfer to monitored bed  2. PA and lateral chest x-ray  3. Device interrogation prior to hospital discharge  4. Followup in device clinic    LABORATORY  Lab  Results   Component Value Date    SODIUM 138 07/05/2018    POTASSIUM 3.9 07/05/2018    CHLORIDE 107 07/05/2018    CO2 22 07/05/2018    GLUCOSE 122 (H) 07/05/2018    BUN 13 07/05/2018    CREATININE 0.74 07/05/2018        Lab Results   Component Value Date    WBC 10.1 07/03/2018    HEMOGLOBIN 14.3 07/03/2018    HEMATOCRIT 42.5 07/03/2018    PLATELETCT 154 (L) 07/03/2018        Total time of the discharge process exceeds 38  minutes.

## 2018-07-07 NOTE — DISCHARGE INSTRUCTIONS
Discharge Instructions    Discharged to other by ambulance with escort. Discharged via ambulance, hospital escort: Yes.  Special equipment needed: Not Applicable    Be sure to schedule a follow-up appointment with your primary care doctor or any specialists as instructed.     Discharge Plan:   Diet Plan: Discussed  Activity Level: Discussed  Confirmed Follow up Appointment: Appointment Scheduled  Confirmed Symptoms Management: Discussed  Medication Reconciliation Updated: Yes  Pneumococcal Vaccine Administered/Refused: Given (See MAR)  Influenza Vaccine Indication: Not indicated: Previously immunized this influenza season and > 8 years of age    I understand that a diet low in cholesterol, fat, and sodium is recommended for good health. Unless I have been given specific instructions below for another diet, I accept this instruction as my diet prescription.   Other diet: diabetic    Special Instructions: None    · Is patient discharged on Warfarin / Coumadin?   Yes    You are receiving the drug warfarin. Please understand the importance of monitoring warfarin with scheduled PT/INR blood draws.  Follow-up with a call to your personal Doctor's office in 3 days to schedule a PT/INR. .    IMPORTANT: HOW TO USE THIS INFORMATION:  This is a summary and does NOT have all possible information about this product. This information does not assure that this product is safe, effective, or appropriate for you. This information is not individual medical advice and does not substitute for the advice of your health care professional. Always ask your health care professional for complete information about this product and your specific health needs.      WARFARIN - ORAL (WARF-uh-rin)      COMMON BRAND NAME(S): Coumadin      WARNING:  Warfarin can cause very serious (possibly fatal) bleeding. This is more likely to occur when you first start taking this medication or if you take too much warfarin. To decrease your risk for bleeding,  "your doctor or other health care provider will monitor you closely and check your lab results (INR test) to make sure you are not taking too much warfarin. Keep all medical and laboratory appointments. Tell your doctor right away if you notice any signs of serious bleeding. See also Side Effects section.      USES:  This medication is used to treat blood clots (such as in deep vein thrombosis-DVT or pulmonary embolus-PE) and/or to prevent new clots from forming in your body. Preventing harmful blood clots helps to reduce the risk of a stroke or heart attack. Conditions that increase your risk of developing blood clots include a certain type of irregular heart rhythm (atrial fibrillation), heart valve replacement, recent heart attack, and certain surgeries (such as hip/knee replacement). Warfarin is commonly called a \"blood thinner,\" but the more correct term is \"anticoagulant.\" It helps to keep blood flowing smoothly in your body by decreasing the amount of certain substances (clotting proteins) in your blood.      HOW TO USE:  Read the Medication Guide provided by your pharmacist before you start taking warfarin and each time you get a refill. If you have any questions, ask your doctor or pharmacist. Take this medication by mouth with or without food as directed by your doctor or other health care professional, usually once a day. It is very important to take it exactly as directed. Do not increase the dose, take it more frequently, or stop using it unless directed by your doctor. Dosage is based on your medical condition, laboratory tests (such as INR), and response to treatment. Your doctor or other health care provider will monitor you closely while you are taking this medication to determine the right dose for you. Use this medication regularly to get the most benefit from it. To help you remember, take it at the same time each day. It is important to eat a balanced, consistent diet while taking warfarin. Some " foods can affect how warfarin works in your body and may affect your treatment and dose. Avoid sudden large increases or decreases in your intake of foods high in vitamin K (such as broccoli, cauliflower, cabbage, brussels sprouts, kale, spinach, and other green leafy vegetables, liver, green tea, certain vitamin supplements). If you are trying to lose weight, check with your doctor before you try to go on a diet. Cranberry products may also affect how your warfarin works. Limit the amount of cranberry juice (16 ounces/480 milliliters a day) or other cranberry products you may drink or eat.      SIDE EFFECTS:  Nausea, loss of appetite, or stomach/abdominal pain may occur. If any of these effects persist or worsen, tell your doctor or pharmacist promptly. Remember that your doctor has prescribed this medication because he or she has judged that the benefit to you is greater than the risk of side effects. Many people using this medication do not have serious side effects. This medication can cause serious bleeding if it affects your blood clotting proteins too much (shown by unusually high INR lab results). Even if your doctor stops your medication, this risk of bleeding can continue for up to a week. Tell your doctor right away if you have any signs of serious bleeding, including: unusual pain/swelling/discomfort, unusual/easy bruising, prolonged bleeding from cuts or gums, persistent/frequent nosebleeds, unusually heavy/prolonged menstrual flow, pink/dark urine, coughing up blood, vomit that is bloody or looks like coffee grounds, severe headache, dizziness/fainting, unusual or persistent tiredness/weakness, bloody/black/tarry stools, chest pain, shortness of breath, difficulty swallowing. Tell your doctor right away if any of these unlikely but serious side effects occur: persistent nausea/vomiting, severe stomach/abdominal pain, yellowing eyes/skin. This drug rarely has caused very serious (possibly fatal)  problems if its effects lead to small blood clots (usually at the beginning of treatment). This can lead to severe skin/tissue damage that may require surgery or amputation if left untreated. Patients with certain blood conditions (protein C or S deficiency) may be at greater risk. Get medical help right away if any of these rare but serious side effects occur: painful/red/purplish patches on the skin (such as on the toe, breast, abdomen), change in the amount of urine, vision changes, confusion, slurred speech, weakness on one side of the body. A very serious allergic reaction to this drug is rare. However, get medical help right away if you notice any symptoms of a serious allergic reaction, including: rash, itching/swelling (especially of the face/tongue/throat), severe dizziness, trouble breathing. This is not a complete list of possible side effects. If you notice other effects not listed above, contact your doctor or pharmacist. In the [place] - Call your doctor for medical advice about side effects. You may report side effects to FDA at 0-879-KFJ-3235. In [place] - Call your doctor for medical advice about side effects. You may report side effects to [facility] [place] at 1-538.240.1638.      PRECAUTIONS:  Before taking warfarin, tell your doctor or pharmacist if you are allergic to it; or if you have any other allergies. This product may contain inactive ingredients, which can cause allergic reactions or other problems. Talk to your pharmacist for more details. Before using this medication, tell your doctor or pharmacist your medical history, especially of: blood disorders (such as anemia, hemophilia), bleeding problems (such as bleeding of the stomach/intestines, bleeding in the brain), blood vessel disorders (such as aneurysms), recent major injury/surgery, liver disease, alcohol use, mental/mood disorders (including memory problems), frequent falls/injuries. It is important that all your doctors and dentists know  that you take warfarin. Before having surgery or any medical/dental procedures, tell your doctor or dentist that you are taking this medication and about all the products you use (including prescription drugs, nonprescription drugs, and herbal products). Avoid getting injections into the muscles. If you must have an injection into a muscle (for example, a flu shot), it should be given in the arm. This way, it will be easier to check for bleeding and/or apply pressure bandages. This medication may cause stomach bleeding. Daily use of alcohol while using this medicine will increase your risk for stomach bleeding and may also affect how this medication works. Limit or avoid alcoholic beverages. If you have not been eating well, if you have an illness or infection that causes fever, vomiting, or diarrhea for more than 2 days, or if you start using any antibiotic medications, contact your doctor or pharmacist immediately because these conditions can affect how warfarin works. This medication can cause heavy bleeding. To lower the chance of getting cut, bruised, or injured, use great caution with sharp objects like safety razors and nail cutters. Use an electric razor when shaving and a soft toothbrush when brushing your teeth. Avoid activities such as contact sports. If you fall or injure yourself, especially if you hit your head, call your doctor immediately. Your doctor may need to check you. The Food & Drug Administration has stated that generic warfarin products are interchangeable. However, consult your doctor or pharmacist before switching warfarin products. Be careful not to take more than one medication that contains warfarin unless specifically directed by the doctor or health care provider who is monitoring your warfarin treatment. Older adults may be at greater risk for bleeding while using this drug. This medication is not recommended for use during pregnancy because of serious (possibly fatal) harm to an  "unborn baby. Discuss the use of reliable forms of birth control with your doctor. If you become pregnant or think you may be pregnant, tell your doctor immediately. If you are planning pregnancy, discuss a plan for managing your condition with your doctor before you become pregnant. Your doctor may switch the type of medication you use during pregnancy. Very small amounts of this medication may pass into breast milk but is unlikely to harm a nursing infant. Consult your doctor before breast-feeding.      DRUG INTERACTIONS:  Drug interactions may change how your medications work or increase your risk for serious side effects. This document does not contain all possible drug interactions. Keep a list of all the products you use (including prescription/nonprescription drugs and herbal products) and share it with your doctor and pharmacist. Do not start, stop, or change the dosage of any medicines without your doctor's approval. Warfarin interacts with many prescription, nonprescription, vitamin, and herbal products. This includes medications that are applied to the skin or inside the vagina or rectum. The interactions with warfarin usually result in an increase or decrease in the \"blood-thinning\" (anticoagulant) effect. Your doctor or other health care professional should closely monitor you to prevent serious bleeding or clotting problems. While taking warfarin, it is very important to tell your doctor or pharmacist of any changes in medications, vitamins, or herbal products that you are taking. Some products that may interact with this drug include: capecitabine, imatinib, mifepristone. Aspirin, aspirin-like drugs (salicylates), and nonsteroidal anti-inflammatory drugs (NSAIDs such as ibuprofen, naproxen, celecoxib) may have effects similar to warfarin. These drugs may increase the risk of bleeding problems if taken during treatment with warfarin. Carefully check all prescription/nonprescription product labels " (including drugs applied to the skin such as pain-relieving creams) since the products may contain NSAIDs or salicylates. Talk to your doctor about using a different medication (such as acetaminophen) to treat pain/fever. Low-dose aspirin and related drugs (such as clopidogrel, ticlopidine) should be continued if prescribed by your doctor for specific medical reasons such as heart attack or stroke prevention. Consult your doctor or pharmacist for more details. Many herbal products interact with warfarin. Tell your doctor before taking any herbal products, especially bromelains, coenzyme Q10, cranberry, danshen, dong quai, fenugreek, garlic, ginkgo biloba, ginseng, and Renato's wort, among others. This medication may interfere with a certain laboratory test to measure theophylline levels, possibly causing false test results. Make sure laboratory personnel and all your doctors know you use this drug.      OVERDOSE:  If overdose is suspected, contact a poison control center or emergency room immediately.  residents can call the [place] Poison Hotline at 1-292.695.4119. [place] residents can call a provincial poison control center. Symptoms of overdose may include: bloody/black/tarry stools, pink/dark urine, unusual/prolonged bleeding.      NOTES:  Do not share this medication with others. Laboratory and/or medical tests (such as INR, complete blood count) must be performed periodically to monitor your progress or check for side effects. Consult your doctor for more details.      MISSED DOSE:  For the best possible benefit, do not miss any doses. If you do miss a dose and remember on the same day, take it as soon as you remember. If you remember on the next day, skip the missed dose and resume your usual dosing schedule. Do not double the dose to catch up because this could increase your risk for bleeding. Keep a record of missed doses to give to your doctor or pharmacist. Contact your doctor or pharmacist if you  miss 2 or more doses in a row.      STORAGE:  Store at room temperature away from light and moisture. Do not store in the bathroom. Keep all medications away from children and pets. Do not flush medications down the toilet or pour them into a drain unless instructed to do so. Properly discard this product when it is  or no longer needed. Consult your pharmacist or local waste disposal company for more details about how to safely discard your product.      MEDICAL ALERT:  Your condition and medication can cause complications in a medical emergency. For information about enrolling in MedicAlert, call 1-269.589.7538 ([place]) or 1-823.210.6500 ([place]).      Information last revised 2010 Copyright(c)  [employer]             Depression / Suicide Risk    As you are discharged from this [facility] facility, it is important to learn how to keep safe from harming yourself.    Recognize the warning signs:  · Abrupt changes in personality, positive or negative- including increase in energy   · Giving away possessions  · Change in eating patterns- significant weight changes-  positive or negative  · Change in sleeping patterns- unable to sleep or sleeping all the time   · Unwillingness or inability to communicate  · Depression  · Unusual sadness, discouragement and loneliness  · Talk of wanting to die  · Neglect of personal appearance   · Rebelliousness- reckless behavior  · Withdrawal from people/activities they love  · Confusion- inability to concentrate     If you or a loved one observes any of these behaviors or has concerns about self-harm, here's what you can do:  · Talk about it- your feelings and reasons for harming yourself  · Remove any means that you might use to hurt yourself (examples: pills, rope, extension cords, firearm)  · Get professional help from the community (Mental Health, Substance Abuse, psychological counseling)  · Do not be alone:Call your Safe Contact- someone whom you  trust who will be there for you.  · Call your local CRISIS HOTLINE 344-6917 or 369-792-5921  · Call your local Children's Mobile Crisis Response Team [place] (052) 790-9954 or www.[employer]  · Call the toll free National Suicide Prevention Hotlines   · National Suicide Prevention Lifeline 488-813-WYEB (1221)  · National Hope Line Network 800-SUICIDE (336-0511)                  DISCHARGE INSTRUCTIONS      Pacemaker Implantation, Care After  Refer to this sheet over the next few weeks. These instructions provide you with information on caring for yourself after the procedure. Your health care provider may also give you more specific instructions. Your treatment has been planned according to current medical practices, but problems sometimes occur. Call your health care provider if you have any problems or questions regarding your pacemaker.   WHAT TO EXPECT AFTER THE PROCEDURE  · You may feel pain. Some pain is normal. It may last a few days.  · A slight bump may be seen over the skin where the device was placed. Sometimes, it is possible to feel the device under the skin. This is normal.  · In the months and years afterward, your health care provider will check the device, the leads, and the battery every few months. Eventually, when the battery is low, the device will be replaced.  HOME CARE INSTRUCTIONS  Medicines  · Take medicines only as directed by your health care provider.  · If you were prescribed an antibiotic medicine, finish it all even if you start to feel better.  · Do not take any other medicines without asking your health care provider first. Some medicines, including certain painkillers, can cause bleeding in your stomach after surgery.  Wound Care  · Do not remove the bandage on your chest until directed to do so by your health care provider.  · After your bandage is removed, you may see pieces of tape called skin adhesive strips over the area where the cut was made (incision site). Let  them fall off on their own.  · Check the incision site every day to make sure it is not infected, bleeding, or starting to pull apart.  · Do not use lotions or ointments near the incision site unless directed to do so.  · Keep the incision area clean and dry for 2-3 days after the procedure or as directed by your health care provider. It takes several weeks for the incision site to completely heal.  · Do not take baths, swim, or use a hot tub until your health care provider approves.  Activities  · Try to walk a little every day. Exercising is important after this procedure. It is also important to use your shoulder on the side of the pacemaker in daily tasks that do not require exaggerated motion.  · Avoid sudden jerking, pulling, or chopping movements that pull your upper arm far away from your body for at least 6 weeks.  · Do not lift your upper arm above your shoulders for at least 6 weeks. This means no tennis, golf, or swimming for this period of time. If you sleep with the arm above your head, use a restraint to prevent this from happening as you sleep.  · You may go back to work when your health care provider says it is okay. Check with your health care provider before you start to drive or play sports.  Other Instructions  · Follow diet instructions if they were provided. You should be able to eat what you usually do right away, but you may need to limit your salt intake.  · Weigh yourself every day. If you suddenly gain weight, fluid may be building up in your body.  · Always carry your pacemaker identification card with you. The card should list the implant date, device model, and . Consider wearing a medical alert bracelet or necklace.  · Tell all health care providers that you have a pacemaker. This may prevent them from giving you a magnetic resource imaging scan (MRI) because of the strong magnets used during that test.  · If you must pass through a metal detector, quickly walk through it.  Do not stop under the detector or stand near it.  · Avoid places or objects with a strong electric or magnetic field, including:  ¨ Airport security strong. When at the airport, let officials know you have a pacemaker. Your ID card will let you be checked in a way that is safe for you and that will not damage your pacemaker. Also, do not let a security person wave a magnetic wand near your pacemaker. That can make it stop working.  ¨ Power plants.  ¨ Large electrical generators.  ¨ Radiofrequency transmission towers, such as cell phone and radio towers.  · Do not use amateur (ham) radio equipment or electric (arc) welding torches. Some devices are safe to use if held at least 1 foot from your pacemaker. These include power tools, lawn mowers, and speakers. If you are unsure of whether something is safe to use, ask your health care provider.  · You may safely use electric blankets, heating pads, computers, and microwave ovens.  · When using your cell phone, hold it to the ear opposite the pacemaker. Do not leave your cell phone in a pocket over the pacemaker.  · Keep all follow-up visits as directed by your health care provider. This is how your health care provider makes sure your chest is healing the way it should. Ask your health care provider when you should come back to have your stitches or staples taken out.  · Have your pacemaker checked every 3-6 months or as directed by your health care provider. Most pacemakers last for 4-8 years before a new one is needed.  SEEK MEDICAL CARE IF:  · You gain weight suddenly.  · Your legs or feet swell more than they have before.  · It feels like your heart is fluttering or skipping beats (heart palpitations).  · You have a fever.  SEEK IMMEDIATE MEDICAL CARE IF:  · You have chest pain.  · You feel more short of breath than you have felt before.  · You feel more light-headed than you have felt before.  · You have problems with your incision site, such as swelling or  bleeding, or it starts to open up.  · You have drainage, redness, swelling, or pain at your incision site.     This information is not intended to replace advice given to you by your health care provider. Make sure you discuss any questions you have with your health care provider.     Document Released: 07/07/2006 Document Revised: 01/08/2016 Document Reviewed: 04/19/2013  [employer] Interactive Patient Education ©2016 [employer]

## 2018-07-07 NOTE — DISCHARGE PLANNING
Anticipated Discharge Disposition: to [facility] skilled    Action: Transportation already set up via [facility] for 10:00. COBRA and IMM letter signed by pt. COBRA and chart copy given to bedside RN.     Barriers to Discharge: none    Plan: Transfer to [facility] via [facility] 10:00.

## 2018-07-07 NOTE — PROGRESS NOTES
Pt to [facility] with [facility], dressed in his own clothes, all belongings sent. Daughter at bedside. DC instructions reviewed with his daughter who is his power of . All questions answered. IV removed. Tele monitor removed.

## 2018-07-07 NOTE — PROGRESS NOTES
Received report and care assumed. Patient A&Ox 4. Pt. Reports no pain, left shoulder in immobilizer. Work of breathing even and unlabored on room air. Discussed POC. Call light within reach and pt. instructed to call when in need of assistance.  Denies any other needs at this time.

## 2018-07-07 NOTE — PROGRESS NOTES
Report called to [facility] admitting RN, all questions answered.  Given specific activity restrictions regarding PM, verbalizes understanding.  Faxed requested documents: DC summary and admission order.

## 2018-07-07 NOTE — PROGRESS NOTES
Pt lying in bed, L arm immobilizer in place, c/o mild pain but denies pain medications at this time.  Call light in reach, bed in lowest position.

## 2018-07-18 ENCOUNTER — NON-PROVIDER VISIT (OUTPATIENT)
Dept: CARDIOLOGY | Facility: MEDICAL CENTER | Age: 78
End: 2018-07-18
Payer: MEDICARE

## 2018-07-18 DIAGNOSIS — R00.1 SYMPTOMATIC BRADYCARDIA: ICD-10-CM

## 2018-07-18 PROCEDURE — 93279 PRGRMG DEV EVAL PM/LDLS PM: CPT | Mod: 26 | Performed by: INTERNAL MEDICINE

## 2018-08-14 PROBLEM — Z95.0 CARDIAC PACEMAKER IN SITU: Status: ACTIVE | Noted: 2018-08-14

## 2018-10-31 ENCOUNTER — APPOINTMENT (OUTPATIENT)
Dept: RADIOLOGY | Facility: MEDICAL CENTER | Age: 78
DRG: 605 | End: 2018-10-31
Attending: EMERGENCY MEDICINE
Payer: MEDICARE

## 2018-10-31 ENCOUNTER — HOSPITAL ENCOUNTER (INPATIENT)
Facility: MEDICAL CENTER | Age: 78
LOS: 3 days | DRG: 605 | End: 2018-11-04
Attending: EMERGENCY MEDICINE | Admitting: FAMILY MEDICINE
Payer: MEDICARE

## 2018-10-31 DIAGNOSIS — S42.001A CLOSED NONDISPLACED FRACTURE OF RIGHT CLAVICLE, UNSPECIFIED PART OF CLAVICLE, INITIAL ENCOUNTER: ICD-10-CM

## 2018-10-31 DIAGNOSIS — G91.2 NPH (NORMAL PRESSURE HYDROCEPHALUS) (HCC): ICD-10-CM

## 2018-10-31 DIAGNOSIS — R53.1 WEAKNESS: ICD-10-CM
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
## 2018-10-31 DIAGNOSIS — S09.90XA CLOSED HEAD INJURY, INITIAL ENCOUNTER: ICD-10-CM

## 2018-10-31 LAB
ALBUMIN SERPL BCP-MCNC: 3.9 G/DL (ref 3.2–4.9)
ALBUMIN/GLOB SERPL: 1.1 G/DL
ALP SERPL-CCNC: 82 U/L (ref 30–99)
ALT SERPL-CCNC: 32 U/L (ref 2–50)
ANION GAP SERPL CALC-SCNC: 9 MMOL/L (ref 0–11.9)
APPEARANCE UR: CLEAR
APTT PPP: 43.9 SEC (ref 24.7–36)
AST SERPL-CCNC: 37 U/L (ref 12–45)
BASOPHILS # BLD AUTO: 0.2 % (ref 0–1.8)
BASOPHILS # BLD: 0.03 K/UL (ref 0–0.12)
BILIRUB SERPL-MCNC: 1.3 MG/DL (ref 0.1–1.5)
BILIRUB UR QL STRIP.AUTO: NEGATIVE
BUN SERPL-MCNC: 27 MG/DL (ref 8–22)
CALCIUM SERPL-MCNC: 8.6 MG/DL (ref 8.4–10.2)
CHLORIDE SERPL-SCNC: 102 MMOL/L (ref 96–112)
CO2 SERPL-SCNC: 22 MMOL/L (ref 20–33)
COLOR UR: YELLOW
CREAT SERPL-MCNC: 1.12 MG/DL (ref 0.5–1.4)
EKG IMPRESSION: NORMAL
EOSINOPHIL # BLD AUTO: 0 K/UL (ref 0–0.51)
EOSINOPHIL NFR BLD: 0 % (ref 0–6.9)
ERYTHROCYTE [DISTWIDTH] IN BLOOD BY AUTOMATED COUNT: 44.3 FL (ref 35.9–50)
GLOBULIN SER CALC-MCNC: 3.7 G/DL (ref 1.9–3.5)
GLUCOSE SERPL-MCNC: 172 MG/DL (ref 65–99)
GLUCOSE UR STRIP.AUTO-MCNC: 100 MG/DL
HCT VFR BLD AUTO: 43.3 % (ref 42–52)
HGB BLD-MCNC: 14.4 G/DL (ref 14–18)
IMM GRANULOCYTES # BLD AUTO: 0.07 K/UL (ref 0–0.11)
IMM GRANULOCYTES NFR BLD AUTO: 0.5 % (ref 0–0.9)
INR PPP: 3.45 (ref 0.87–1.13)
KETONES UR STRIP.AUTO-MCNC: NEGATIVE MG/DL
LEUKOCYTE ESTERASE UR QL STRIP.AUTO: NEGATIVE
LYMPHOCYTES # BLD AUTO: 2.13 K/UL (ref 1–4.8)
LYMPHOCYTES NFR BLD: 15.3 % (ref 22–41)
MCH RBC QN AUTO: 30.1 PG (ref 27–33)
MCHC RBC AUTO-ENTMCNC: 33.3 G/DL (ref 33.7–35.3)
MCV RBC AUTO: 90.4 FL (ref 81.4–97.8)
MICRO URNS: ABNORMAL
MONOCYTES # BLD AUTO: 2.09 K/UL (ref 0–0.85)
MONOCYTES NFR BLD AUTO: 15 % (ref 0–13.4)
NEUTROPHILS # BLD AUTO: 9.63 K/UL (ref 1.82–7.42)
NEUTROPHILS NFR BLD: 69 % (ref 44–72)
NITRITE UR QL STRIP.AUTO: NEGATIVE
NRBC # BLD AUTO: 0 K/UL
NRBC BLD-RTO: 0 /100 WBC
PH UR STRIP.AUTO: 5.5 [PH]
PLATELET # BLD AUTO: 168 K/UL (ref 164–446)
PMV BLD AUTO: 11.6 FL (ref 9–12.9)
POTASSIUM SERPL-SCNC: 4.1 MMOL/L (ref 3.6–5.5)
PROT SERPL-MCNC: 7.6 G/DL (ref 6–8.2)
PROT UR QL STRIP: NEGATIVE MG/DL
PROTHROMBIN TIME: 34.2 SEC (ref 12–14.6)
RBC # BLD AUTO: 4.79 M/UL (ref 4.7–6.1)
RBC UR QL AUTO: NEGATIVE
SODIUM SERPL-SCNC: 133 MMOL/L (ref 135–145)
SP GR UR STRIP.AUTO: 1.02
TROPONIN I SERPL-MCNC: <0.02 NG/ML (ref 0–0.04)
WBC # BLD AUTO: 14 K/UL (ref 4.8–10.8)

## 2018-10-31 PROCEDURE — 85025 COMPLETE CBC W/AUTO DIFF WBC: CPT

## 2018-10-31 PROCEDURE — 36415 COLL VENOUS BLD VENIPUNCTURE: CPT

## 2018-10-31 PROCEDURE — 71045 X-RAY EXAM CHEST 1 VIEW: CPT

## 2018-10-31 PROCEDURE — 73030 X-RAY EXAM OF SHOULDER: CPT | Mod: RT

## 2018-10-31 PROCEDURE — 85610 PROTHROMBIN TIME: CPT

## 2018-10-31 PROCEDURE — 99285 EMERGENCY DEPT VISIT HI MDM: CPT

## 2018-10-31 PROCEDURE — 85730 THROMBOPLASTIN TIME PARTIAL: CPT

## 2018-10-31 PROCEDURE — 70450 CT HEAD/BRAIN W/O DYE: CPT

## 2018-10-31 PROCEDURE — 84484 ASSAY OF TROPONIN QUANT: CPT

## 2018-10-31 PROCEDURE — 304561 HCHG STAT O2

## 2018-10-31 PROCEDURE — 93005 ELECTROCARDIOGRAM TRACING: CPT | Performed by: EMERGENCY MEDICINE

## 2018-10-31 PROCEDURE — 81003 URINALYSIS AUTO W/O SCOPE: CPT

## 2018-10-31 PROCEDURE — 80053 COMPREHEN METABOLIC PANEL: CPT

## 2018-10-31 PROCEDURE — G0378 HOSPITAL OBSERVATION PER HR: HCPCS

## 2018-10-31 RX ORDER — WARFARIN SODIUM 1 MG/1
1-1.5 TABLET ORAL
Status: DISCONTINUED | OUTPATIENT
Start: 2018-10-31 | End: 2018-11-01

## 2018-10-31 RX ORDER — LABETALOL HYDROCHLORIDE 5 MG/ML
10 INJECTION, SOLUTION INTRAVENOUS EVERY 4 HOURS PRN
Status: DISCONTINUED | OUTPATIENT
Start: 2018-10-31 | End: 2018-11-04 | Stop reason: HOSPADM

## 2018-10-31 RX ORDER — METOPROLOL TARTRATE 50 MG/1
50 TABLET, FILM COATED ORAL 2 TIMES DAILY
Status: DISCONTINUED | OUTPATIENT
Start: 2018-11-01 | End: 2018-11-04 | Stop reason: HOSPADM

## 2018-10-31 RX ORDER — ACETAMINOPHEN 325 MG/1
650 TABLET ORAL EVERY 6 HOURS PRN
Status: DISCONTINUED | OUTPATIENT
Start: 2018-10-31 | End: 2018-11-04 | Stop reason: HOSPADM

## 2018-10-31 RX ORDER — DEXTROSE MONOHYDRATE 25 G/50ML
25 INJECTION, SOLUTION INTRAVENOUS
Status: DISCONTINUED | OUTPATIENT
Start: 2018-10-31 | End: 2018-11-04 | Stop reason: HOSPADM

## 2018-10-31 RX ORDER — ONDANSETRON 2 MG/ML
4 INJECTION INTRAMUSCULAR; INTRAVENOUS EVERY 4 HOURS PRN
Status: DISCONTINUED | OUTPATIENT
Start: 2018-10-31 | End: 2018-11-04 | Stop reason: HOSPADM

## 2018-10-31 RX ORDER — HYDROCODONE BITARTRATE AND ACETAMINOPHEN 5; 325 MG/1; MG/1
1 TABLET ORAL EVERY 6 HOURS PRN
Status: DISCONTINUED | OUTPATIENT
Start: 2018-10-31 | End: 2018-11-04 | Stop reason: HOSPADM

## 2018-10-31 RX ORDER — MIRTAZAPINE 15 MG/1
7.5 TABLET, ORALLY DISINTEGRATING ORAL NIGHTLY
Status: DISCONTINUED | OUTPATIENT
Start: 2018-11-01 | End: 2018-11-04 | Stop reason: HOSPADM

## 2018-10-31 RX ORDER — ATORVASTATIN CALCIUM 10 MG/1
10 TABLET, FILM COATED ORAL DAILY
Status: DISCONTINUED | OUTPATIENT
Start: 2018-11-01 | End: 2018-11-04 | Stop reason: HOSPADM

## 2018-10-31 RX ORDER — QUETIAPINE FUMARATE 25 MG/1
25 TABLET, FILM COATED ORAL
Status: DISCONTINUED | OUTPATIENT
Start: 2018-11-01 | End: 2018-11-04 | Stop reason: HOSPADM

## 2018-10-31 RX ORDER — SODIUM CHLORIDE 9 MG/ML
INJECTION, SOLUTION INTRAVENOUS CONTINUOUS
Status: DISCONTINUED | OUTPATIENT
Start: 2018-10-31 | End: 2018-11-04 | Stop reason: HOSPADM

## 2018-10-31 ASSESSMENT — PAIN SCALES - GENERAL
PAINLEVEL_OUTOF10: 5
PAINLEVEL_OUTOF10: 2

## 2018-11-01 PROBLEM — N19 ACUTE PRERENAL AZOTEMIA: Status: ACTIVE | Noted: 2018-11-01

## 2018-11-01 PROBLEM — S09.90XA CLOSED HEAD INJURY: Status: ACTIVE | Noted: 2018-11-01

## 2018-11-01 PROBLEM — N28.9 RENAL INSUFFICIENCY: Status: ACTIVE | Noted: 2018-11-01

## 2018-11-01 PROBLEM — R53.1 GENERALIZED WEAKNESS: Status: ACTIVE | Noted: 2018-07-06

## 2018-11-01 PROBLEM — S42.001A CLOSED RIGHT CLAVICULAR FRACTURE: Status: ACTIVE | Noted: 2018-11-01

## 2018-11-01 LAB
GLUCOSE BLD-MCNC: 118 MG/DL (ref 65–99)
GLUCOSE BLD-MCNC: 122 MG/DL (ref 65–99)
GLUCOSE BLD-MCNC: 133 MG/DL (ref 65–99)
GLUCOSE BLD-MCNC: 98 MG/DL (ref 65–99)
INR PPP: 3.51 (ref 0.87–1.13)
PROTHROMBIN TIME: 34.7 SEC (ref 12–14.6)

## 2018-11-01 PROCEDURE — G8979 MOBILITY GOAL STATUS: HCPCS | Mod: CL

## 2018-11-01 PROCEDURE — G8988 SELF CARE GOAL STATUS: HCPCS | Mod: CL

## 2018-11-01 PROCEDURE — 700102 HCHG RX REV CODE 250 W/ 637 OVERRIDE(OP): Performed by: FAMILY MEDICINE

## 2018-11-01 PROCEDURE — G8978 MOBILITY CURRENT STATUS: HCPCS | Mod: CM

## 2018-11-01 PROCEDURE — 82962 GLUCOSE BLOOD TEST: CPT

## 2018-11-01 PROCEDURE — 700105 HCHG RX REV CODE 258: Performed by: FAMILY MEDICINE

## 2018-11-01 PROCEDURE — 94760 N-INVAS EAR/PLS OXIMETRY 1: CPT

## 2018-11-01 PROCEDURE — 97166 OT EVAL MOD COMPLEX 45 MIN: CPT

## 2018-11-01 PROCEDURE — 85610 PROTHROMBIN TIME: CPT

## 2018-11-01 PROCEDURE — 36415 COLL VENOUS BLD VENIPUNCTURE: CPT

## 2018-11-01 PROCEDURE — A9270 NON-COVERED ITEM OR SERVICE: HCPCS | Performed by: FAMILY MEDICINE

## 2018-11-01 PROCEDURE — 770006 HCHG ROOM/CARE - MED/SURG/GYN SEMI*

## 2018-11-01 PROCEDURE — A9270 NON-COVERED ITEM OR SERVICE: HCPCS | Performed by: HOSPITALIST

## 2018-11-01 PROCEDURE — 700102 HCHG RX REV CODE 250 W/ 637 OVERRIDE(OP): Performed by: HOSPITALIST

## 2018-11-01 PROCEDURE — 99222 1ST HOSP IP/OBS MODERATE 55: CPT | Performed by: FAMILY MEDICINE

## 2018-11-01 PROCEDURE — 97162 PT EVAL MOD COMPLEX 30 MIN: CPT

## 2018-11-01 PROCEDURE — G8987 SELF CARE CURRENT STATUS: HCPCS | Mod: CM

## 2018-11-01 RX ORDER — PHYTONADIONE 5 MG/1
10 TABLET ORAL ONCE
Status: COMPLETED | OUTPATIENT
Start: 2018-11-01 | End: 2018-11-01

## 2018-11-01 RX ADMIN — METOPROLOL TARTRATE 50 MG: 50 TABLET ORAL at 16:31

## 2018-11-01 RX ADMIN — PHYTONADIONE 10 MG: 5 TABLET ORAL at 17:22

## 2018-11-01 RX ADMIN — MIRTAZAPINE 7.5 MG: 15 TABLET, ORALLY DISINTEGRATING ORAL at 20:37

## 2018-11-01 RX ADMIN — ATORVASTATIN CALCIUM 10 MG: 10 TABLET, FILM COATED ORAL at 05:59

## 2018-11-01 RX ADMIN — CALCIUM CARBONATE-VITAMIN D TAB 500 MG-200 UNIT 1 TABLET: 500-200 TAB at 06:00

## 2018-11-01 RX ADMIN — ACETAMINOPHEN 650 MG: 325 TABLET, FILM COATED ORAL at 06:00

## 2018-11-01 RX ADMIN — METFORMIN HYDROCHLORIDE 500 MG: 500 TABLET ORAL at 07:56

## 2018-11-01 RX ADMIN — SODIUM CHLORIDE: 9 INJECTION, SOLUTION INTRAVENOUS at 01:11

## 2018-11-01 RX ADMIN — QUETIAPINE FUMARATE 25 MG: 25 TABLET ORAL at 01:09

## 2018-11-01 RX ADMIN — SODIUM CHLORIDE: 9 INJECTION, SOLUTION INTRAVENOUS at 12:40

## 2018-11-01 RX ADMIN — METOPROLOL TARTRATE 50 MG: 50 TABLET ORAL at 05:59

## 2018-11-01 RX ADMIN — QUETIAPINE FUMARATE 25 MG: 25 TABLET ORAL at 20:37

## 2018-11-01 RX ADMIN — METFORMIN HYDROCHLORIDE 500 MG: 500 TABLET ORAL at 16:31

## 2018-11-01 RX ADMIN — MIRTAZAPINE 7.5 MG: 15 TABLET, ORALLY DISINTEGRATING ORAL at 01:10

## 2018-11-01 ASSESSMENT — COGNITIVE AND FUNCTIONAL STATUS - GENERAL
PERSONAL GROOMING: A LITTLE
PERSONAL GROOMING: A LITTLE
DAILY ACTIVITIY SCORE: 12
TURNING FROM BACK TO SIDE WHILE IN FLAT BAD: A LITTLE
SUGGESTED CMS G CODE MODIFIER MOBILITY: CK
DRESSING REGULAR LOWER BODY CLOTHING: TOTAL
EATING MEALS: A LITTLE
DRESSING REGULAR UPPER BODY CLOTHING: TOTAL
STANDING UP FROM CHAIR USING ARMS: A LOT
HELP NEEDED FOR BATHING: A LOT
DRESSING REGULAR UPPER BODY CLOTHING: A LITTLE
MOVING FROM LYING ON BACK TO SITTING ON SIDE OF FLAT BED: A LITTLE
WALKING IN HOSPITAL ROOM: A LITTLE
MOVING TO AND FROM BED TO CHAIR: A LOT
HELP NEEDED FOR BATHING: A LITTLE
TOILETING: A LITTLE
DAILY ACTIVITIY SCORE: 18
EATING MEALS: A LITTLE
CLIMB 3 TO 5 STEPS WITH RAILING: A LOT
MOBILITY SCORE: 15
DRESSING REGULAR LOWER BODY CLOTHING: A LITTLE
TOILETING: A LOT
SUGGESTED CMS G CODE MODIFIER DAILY ACTIVITY: CK
SUGGESTED CMS G CODE MODIFIER DAILY ACTIVITY: CL

## 2018-11-01 ASSESSMENT — ENCOUNTER SYMPTOMS
NECK PAIN: 0
ABDOMINAL PAIN: 0
DEPRESSION: 0
DIZZINESS: 0
FEVER: 0
RESPIRATORY NEGATIVE: 1
INSOMNIA: 0
SHORTNESS OF BREATH: 0
VOMITING: 0
CHILLS: 0
GASTROINTESTINAL NEGATIVE: 1
BACK PAIN: 0
EYE PAIN: 0
HEADACHES: 0
PALPITATIONS: 0
TINGLING: 0
FALLS: 1
BLURRED VISION: 0
NAUSEA: 0
COUGH: 0
SORE THROAT: 0
WEAKNESS: 1
CARDIOVASCULAR NEGATIVE: 1

## 2018-11-01 ASSESSMENT — CHA2DS2 SCORE
DIABETES: YES
AGE 65 TO 74: YES
AGE 75 OR GREATER: YES
SEX: MALE
CHA2DS2 VASC SCORE: 4
VASCULAR DISEASE: NO
PRIOR STROKE OR TIA OR THROMBOEMBOLISM: NO
CHF OR LEFT VENTRICULAR DYSFUNCTION: NO
HYPERTENSION: NO

## 2018-11-01 ASSESSMENT — GAIT ASSESSMENTS
ASSISTIVE DEVICE: HEMI-WALKER
DISTANCE (FEET): 2
GAIT LEVEL OF ASSIST: MODERATE ASSIST
DEVIATION: SHUFFLED GAIT;DECREASED HEEL STRIKE;DECREASED TOE OFF;STEP TO

## 2018-11-01 ASSESSMENT — PAIN SCALES - GENERAL
PAINLEVEL_OUTOF10: 2
PAINLEVEL_OUTOF10: 2
PAINLEVEL_OUTOF10: 0
PAINLEVEL_OUTOF10: 1
PAINLEVEL_OUTOF10: 5

## 2018-11-01 ASSESSMENT — LIFESTYLE VARIABLES
ALCOHOL_USE: NO
EVER_SMOKED: NEVER

## 2018-11-01 ASSESSMENT — ACTIVITIES OF DAILY LIVING (ADL): TOILETING: INDEPENDENT

## 2018-11-01 NOTE — CARE PLAN
Problem: Communication  Goal: The ability to communicate needs accurately and effectively will improve  Outcome: PROGRESSING AS EXPECTED  Plan of care discussed with the patient at bedside as well as Randall's daughter (ASPEN) by phone.   Daughter, Rita will follow up with SW for placement of patient in SNF.     Problem: Safety  Goal: Will remain free from injury  Outcome: PROGRESSING AS EXPECTED  Patient is wearing non skid footwear at all times.  Patient has call bell within reach and is calling appropriately for assistance.  Patient has bed alarm on.   Bed is in lowest position and wheels are locked.

## 2018-11-01 NOTE — PROGRESS NOTES
2 RN skin assessment completed. Skin fragile, poor turgur. Pt has an abrasion to the R forehead and abrasions to the R shoulder. Skin otherwise WNL.

## 2018-11-01 NOTE — PROGRESS NOTES
Pt arrives to unit from ER. Transferred from [facility] to bed via slideboard. Pt alert and able to answer most orientation questions, knows his name/, where he lives, why he is in the hospital.  Pt denies any pain at rest but c/o pain with moving the R arm. Sling in place. Pt oriented to unit and plan of care discussed. History, allergies, and med rec reviewed with pt and admission profile completed to the best of his knowledge.

## 2018-11-01 NOTE — ED TRIAGE NOTES
.  Chief Complaint   Patient presents with   • Shoulder Pain     Pt was eating dinner and fell out of his chair to the rt side. pt has rt shoulder pain   • T-5000 FALL     denies any headache.    • Weakness     Pt has had increasing weakness all day increasing from standby assist to a 2 person assist.

## 2018-11-01 NOTE — ASSESSMENT & PLAN NOTE
Worsening over the past several years per daughter. Likely deconditioning 2/2 chronic debility from chronic etoh per daughter.

## 2018-11-01 NOTE — ED PROVIDER NOTES
ED Provider Note    Means of arrival: EMS  History obtained from: EMS/daughter  History limited by: patient's dementia    CHIEF COMPLAINT  Chief Complaint   Patient presents with   • Shoulder Pain     Pt was eating dinner and fell out of his chair to the rt side. pt has rt shoulder pain   • T-5000 FALL     denies any headache.    • Weakness     Pt has had increasing weakness all day increasing from standby assist to a 2 person assist.        KARYN Odell is a 78 y.o. male with past medical history significant for atrial fibrillation on Coumadin, symptomatic bradycardia status post pacemaker, diabetes, and normal pressure hydrocephalus who presents to the Emergency Department after a fall.  All history is obtained from EMS and patient's daughter as patient has baseline dementia and is a poor historian.  Per EMS patient's care facility reports that he has had increased weakness throughout the day.  Patient is normally ambulatory at baseline, however throughout the day he has required 2 person assist in order to get around which is grossly different from his normal baseline.  While eating dinner he fell out of his chair secondary to his weakness and hit his right shoulder and the right side of his head.  Therefore EMS was called to bring him here for further evaluation.  Patient is complaining of right shoulder pain but further history cannot be obtained from him secondary to his dementia.  Associated with his weakness he reportedly had a couple episodes of emesis today.    REVIEW OF SYSTEMS  ROS  Patient is complaining of right shoulder pain denies chest pain and headache.  Further history cannot be obtained given patient's dementia    Below history is obtained from daughter and chart review    PAST MEDICAL HISTORY   has a past medical history of A-fib (HCC); Acid reflux disease; Diabetes (HCC); Normal pressure hydrocephalus; and Urinary incontinence.    SURGICAL HISTORY   has a past surgical history that  "includes other orthopedic surgery.    SOCIAL HISTORY  Social History   Substance Use Topics   • Smoking status: Never Smoker   • Smokeless tobacco: Never Used   • Alcohol use Yes      Comment: Pt states he has quit drinking      History   Drug Use No       FAMILY HISTORY  Family History   Problem Relation Age of Onset   • No Known Problems Mother    • Heart Disease Father        CURRENT MEDICATIONS  Home Medications    Warfarin      ALLERGIES  No Known Allergies    PHYSICAL EXAM  VITAL SIGNS: Pulse 72   Temp 36.4 °C (97.5 °F)   Resp (!) 21   Ht 1.727 m (5' 8\")   Wt 87.6 kg (193 lb 2 oz)   SpO2 95%   BMI 29.36 kg/m²   Vitals reviewed by myself.  Physical Exam   Constitutional: He is well-developed, well-nourished, and in no distress.   HENT:   Head: Normocephalic and atraumatic.   Eyes: EOM are normal.   Neck: Normal range of motion.   Cardiovascular: Normal rate and normal heart sounds.  Exam reveals no gallop and no friction rub.    No murmur heard.  irregular rhythm   Pulmonary/Chest: Effort normal. No respiratory distress. He has no wheezes. He has no rales.   Abdominal: Soft. He exhibits no distension. There is no tenderness. There is no rebound.   Musculoskeletal:   Patient has tenderness to palpation of the right clavicle associated with pain with range of motion of the right shoulder.   strength is 5 out of 5 bilaterally.  5 out of 5 strength is present in bilateral lower extremities.   Neurological:   Patient is alert to self only, no focal neurologic deficits are noted.         DIAGNOSTIC STUDIES /  LABS  Labs Reviewed   CBC WITH DIFFERENTIAL - Abnormal; Notable for the following:        Result Value    WBC 14.0 (*)     MCHC 33.3 (*)     Lymphocytes 15.30 (*)     Monocytes 15.00 (*)     Neutrophils (Absolute) 9.63 (*)     Monos (Absolute) 2.09 (*)     All other components within normal limits    Narrative:     Indicate which anticoagulants the patient is on:->UNKNOWN   COMP METABOLIC PANEL - " Abnormal; Notable for the following:     Sodium 133 (*)     Glucose 172 (*)     Bun 27 (*)     Globulin 3.7 (*)     All other components within normal limits    Narrative:     Indicate which anticoagulants the patient is on:->UNKNOWN   APTT - Abnormal; Notable for the following:     APTT 43.9 (*)     All other components within normal limits    Narrative:     Indicate which anticoagulants the patient is on:->UNKNOWN   PROTHROMBIN TIME - Abnormal; Notable for the following:     PT 34.2 (*)     INR 3.45 (*)     All other components within normal limits    Narrative:     Indicate which anticoagulants the patient is on:->UNKNOWN   TROPONIN    Narrative:     Indicate which anticoagulants the patient is on:->UNKNOWN   ESTIMATED GFR    Narrative:     Indicate which anticoagulants the patient is on:->UNKNOWN   URINALYSIS,CULTURE IF INDICATED       All labs reviewed by me.    EKG Interpretation:  Interpreted by myself    12 Lead EKG interpreted by me to show:  EKG at 2129: Atrial fibrillation, heart rate 83, left axis deviation, normal intervals, no acute ST elevation, there are diffuse T wave inversions in inferolateral leads, no evidence of acute arrhythmia or ischemia  My impression of this EKG: Does not indicate ischemia or arrhythmia at this time.    RADIOLOGY  DX-CHEST-PORTABLE (1 VIEW)   Final Result         1.  Interstitial edema or infiltrates versus chronic underlying lung disease, similar to prior study.   2.  Cardiomegaly   3.  Atherosclerosis      DX-SHOULDER 2+ RIGHT   Final Result         1.  Right clavicular head and neck fracture.         CT-HEAD W/O   Final Result         1.  No acute intracranial abnormality is identified, nonspecific changes, most commonly associated with small vessel ischemic disease. Associated moderate cerebral atrophy.   2.  Atherosclerosis.        The radiologist's interpretation of all radiological studies have been reviewed by me.      COURSE & MEDICAL DECISION MAKING  Nursing  notes, VS, PMSFHx reviewed in chart.    Patient is a 78-year-old male who comes in for generalized weakness resulting in a subsequent fall today.  Differential diagnosis includes electrolyte abnormality, infection, deconditioning, intracranial abnormality, acute kidney injury, dehydration, clavicle fracture versus musculoskeletal strain.  Diagnostic workup includes labs, CT of the head, and shoulder x-ray.  On initial assessment patient is well-appearing with vitals in normal limits.  He has no focal neuro deficits, he is noted to be alert to self only, however per daughter this is his baseline for his dementia.  Patient's EKG returns demonstrates atrial fibrillation without RVR.  There is no evidence of acute arrhythmia or ischemia.  CT of the head returns demonstrates no acute intracranial processes.  Patient's troponin returns and is within normal limits.  Labs returned and are unremarkable except for mild leukocytosis of 14.  Chest x-ray demonstrates no evidence of infection.  I am uncertain why patient has this leukocytosis but has no clear etiology of infection at this time, urinalysis is still pending.  Right shoulder x-ray is consistent with a right clavicular head and neck fracture for which patient will be treated with a sling.  Given his worsening weakness from his baseline I will admit him for further management.  I discussed the case with Dr. Perez who has accepted the admission.  At time of admission patient is in guarded condition and urinalysis is pending.    FINAL IMPRESSION  1. Weakness    2. Fall, initial encounter    3. Closed nondisplaced fracture of right clavicle, unspecified part of clavicle, initial encounter    4. Closed head injury, initial encounter

## 2018-11-01 NOTE — PROGRESS NOTES
[facility] Hospitalist Progress Note    Date of Service: 2018    Chief Complaint  78 y.o. male admitted 10/31/2018 with generalized weakness and a fall resulting in a right clavicular fracture.     Interval Problem Update  Somewhat confused today. Appears to repeat sentences. Memory poor.     Consultants/Specialty  none    Disposition  Needs snf. Order placed.         Review of Systems   Constitutional: Positive for malaise/fatigue. Negative for chills and fever.   HENT: Negative for sore throat.    Eyes: Negative for blurred vision and pain.   Respiratory: Negative for cough and shortness of breath.    Cardiovascular: Negative for chest pain and palpitations.   Gastrointestinal: Negative for abdominal pain, nausea and vomiting.   Genitourinary: Negative for dysuria and urgency.   Musculoskeletal: Positive for joint pain. Negative for back pain and neck pain.   Skin: Negative for itching and rash.   Neurological: Positive for weakness. Negative for dizziness, tingling and headaches.   Psychiatric/Behavioral: Negative for depression. The patient does not have insomnia.    All other systems reviewed and are negative.     Physical Exam  Laboratory/Imaging   Hemodynamics  Temp (24hrs), Av.6 °C (97.9 °F), Min:36.3 °C (97.4 °F), Max:36.9 °C (98.4 °F)   Temperature: 36.3 °C (97.4 °F)  Pulse  Av.7  Min: 60  Max: 86 Heart Rate (Monitored): 75  Blood Pressure : 100/53, NIBP: (!) 90/59      Respiratory      Respiration: 18, Pulse Oximetry: 92 %             Fluids    Intake/Output Summary (Last 24 hours) at 18 1002  Last data filed at 18 0017   Gross per 24 hour   Intake              200 ml   Output                0 ml   Net              200 ml       Nutrition  Orders Placed This Encounter   Procedures   • Diet Order Cardiac, Diabetic     Standing Status:   Standing     Number of Occurrences:   1     Order Specific Question:   Diet:     Answer:   Cardiac [6]     Order Specific Question:   Diet:     Answer:    Diabetic [3]     Physical Exam   Constitutional: He is oriented to person, place, and time. He appears well-developed and well-nourished. No distress.   Patient seen and examined  Plan discussed with RUPERTO LEE:   Right Ear: External ear normal.   Left Ear: External ear normal.   Nose: Nose normal.   Eyes: Right eye exhibits no discharge. Left eye exhibits no discharge. No scleral icterus.   Neck: No JVD present. No tracheal deviation present.   Cardiovascular: Normal rate, normal heart sounds and intact distal pulses.    No murmur heard.  Cap refill 2sec  Pulses 2+ throughout     Pulmonary/Chest: Effort normal and breath sounds normal. No respiratory distress. He has no wheezes. He has no rales.   Abdominal: Soft. Bowel sounds are normal. He exhibits no distension. There is no tenderness. There is no guarding.   Musculoskeletal: He exhibits no edema or tenderness.   Right shoulder in brace. Tender clavicle.    Neurological: He is alert and oriented to person, place, and time.   Skin: Skin is warm and dry. He is not diaphoretic. No erythema.   Normal skin color   Psychiatric: He has a normal mood and affect. His behavior is normal.   Nursing note and vitals reviewed.      Recent Labs      10/31/18   2105   WBC  14.0*   RBC  4.79   HEMOGLOBIN  14.4   HEMATOCRIT  43.3   MCV  90.4   MCH  30.1   MCHC  33.3*   RDW  44.3   PLATELETCT  168   MPV  11.6     Recent Labs      10/31/18   2105   SODIUM  133*   POTASSIUM  4.1   CHLORIDE  102   CO2  22   GLUCOSE  172*   BUN  27*   CREATININE  1.12   CALCIUM  8.6     Recent Labs      10/31/18   2105  11/01/18   0636   APTT  43.9*   --    INR  3.45*  3.51*                  Assessment/Plan     Generalized weakness- (present on admission)   Assessment & Plan    Worsening recently. ?NPH related. I suspect this is possible given what appears to be worsening dementia. Will reverse coumadin and anticipate trying a lumbar puncture to see if 30cc removal helps him.         Acute prerenal  azotemia   Assessment & Plan    Continue iv fluids.         Closed right clavicular fracture   Assessment & Plan    Pain control and immobilization.         Closed head injury   Assessment & Plan    No bleed. Neuro checks ongoing.         NPH (normal pressure hydrocephalus)- (present on admission)   Assessment & Plan    Documented in past. Ct with large ventricles.   Will trp LP after INR down to see if this is the etiology of his weakness.         Type 2 diabetes mellitus without complication, without long-term current use of insulin (HCC)- (present on admission)   Assessment & Plan    Metformin and ssi        Permanent atrial fibrillation (HCC)- (present on admission)   Assessment & Plan    Continue tele  Reverse coumadin for likely LP          Symptomatic bradycardia- (present on admission)   Assessment & Plan    Has pacer          Quality-Core Measures   Reviewed items::  EKG reviewed, Radiology images reviewed, Labs reviewed and Medications reviewed  Cabello catheter::  No Cabello  DVT prophylaxis pharmacological::  Warfarin (Coumadin)

## 2018-11-01 NOTE — THERAPY
"Occupational Therapy Evaluation completed.   Plan of Care: Will benefit from Occupational Therapy 3 times per week  Discharge Recommendations:  Equipment: Will Continue to Assess for Equipment Needs. Post-acute therapy Discharge to a transitional care facility for continued skilled therapy services.    Patient s/p fall resulting in R clavicular head and neck fx with sling. NEED WB and ROM CLARIFICATION ORDERS for R UE. Treated as NWB and no ROM for R UE with sling. Per chart and nurse, non operative management at this time. Patient report A with bathing at baseline and using 4ww or FWW. Patient, upon eval, presents with poor cognition, decreased balance, endurance, tolerance, ADLs, mobility necessitating Total A ADLs and Max A mobility. Patient would benefit from skilled OT in this setting followed by post acute placement.     See \"Rehab Therapy-Acute\" Patient Summary Report for complete documentation.    "

## 2018-11-01 NOTE — PROGRESS NOTES
Inpatient Anticoagulation Service Note    Date: 11/1/2018  Reason for Anticoagulation: Atrial Fibrillation   [id number] VASc Score: 4    Hemoglobin Value: 14.4  Hematocrit Value: 43.3  Lab Platelet Value: 168  Target INR: 2.0 to 3.0    INR from last 7 days     Date/Time INR Value    11/01/18 0636 (!)  3.51    10/31/18 2105 (!)  3.45        Dose from last 7 days     Date/Time Dose (mg)    11/01/18 0636  0        Significant Interactions: Statin (If less than 5 days and overlap therapy discontinued -- document reason (i.e. Bleed Risk))    (If still on overlap therapy, if No -- document reason (i.e. Bleed Risk))         Plan:  Hold warfarin today due to supratherapeutic INR  Education Material Provided?: No (Long-term anticoag patient)  Pharmacist suggested discharge dosing: Resume home regimen     Mick Greene, PharmD

## 2018-11-01 NOTE — H&P
Hospital Medicine History & Physical Note    Date of Service  11/1/2018    Primary Care Physician  JEREMIAH Holland    Code Status  Full code    Chief Complaint  Weakness, fall    History of Presenting Illness  Patient is a 78 year old male who comes to the ER because of weakness and a ground level fall. Most of the history is taken from the chart as patient cannot give me much of a history. Apparently, patient has been getting progressively more weak yesterday. He is normally ambulatory on his own but was too weak to get around on his own. He required two people to help him get up from his chair. During dinner time, he fell out of his chair and landed on his right side, hitting his right arm and right side of his head. He did suffer a laceration to his forehead and also complaining of right shoulder pain. Patient is currently on Coumadin daily for atrial fibrillation. In the ER, CT head is negative. Right shoulder x-ray reveals a right clavicular head and neck fracture. WBC is elevated at 14,000. There is mild renal insufficiency. INR is 3.45.       Review of Systems  Review of Systems   Respiratory: Negative.    Cardiovascular: Negative.    Gastrointestinal: Negative.    Genitourinary: Negative.    Musculoskeletal: Positive for falls.   Neurological: Positive for weakness.   All other systems reviewed and are negative.      Past Medical History   has a past medical history of A-fib ([facility]); Acid reflux disease; Diabetes ([facility]); Normal pressure hydrocephalus; and Urinary incontinence.    Surgical History   has a past surgical history that includes other orthopedic surgery.     Family History  family history includes Heart Disease in his father; No Known Problems in his mother.     Social History   reports that he has never smoked. He has never used smokeless tobacco. He reports that he drinks alcohol. He reports that he does not use drugs.    Allergies  No Known Allergies    Medications  Prior to Admission  Medications   Prescriptions Last Dose Informant Patient Reported? Taking?   Calcium Carbonate-Vitamin D (CALCIUM 600 + D) 600-400 MG-UNIT Tab 10/31/2018 at 0900 MAR from Other Facility Yes No   Sig: Take 1 Tab by mouth every day.   QUEtiapine (SEROQUEL) 25 MG Tab 10/31/2018 at 2000 MAR from Other Facility Yes No   Sig: Take 25 mg by mouth every bedtime.   atorvastatin (LIPITOR) 10 MG Tab 10/31/2018 at 2000 MAR from Other Facility Yes No   Sig: Take 10 mg by mouth every day.   metFORMIN (GLUCOPHAGE) 500 MG Tab 10/31/2018 at 1700 MAR from Other Facility Yes No   Sig: Take 500 mg by mouth 2 times a day, with meals.   metoprolol (LOPRESSOR) 50 MG Tab 10/31/2018 at Unknown time  No No   Sig: Take 1 Tab by mouth 2 times a day.   mirtazapine (REMERON) 15 MG Tab 10/31/2018 at 2100 MAR from Other Facility Yes No   Sig: Take 7.5 mg by mouth every evening.   warfarin (COUMADIN) 1 MG Tab 10/31/2018 at 0800 MAR from Other Facility Yes No   Sig: Take 1-1.5 mg by mouth. Pt takes:  1MG on Mon, Tue, and Wed  1.5MG on Thur, Fri, Sat and Sun      Facility-Administered Medications: None       Physical Exam  Temp:  [36.4 °C (97.5 °F)-36.8 °C (98.3 °F)] 36.8 °C (98.3 °F)  Pulse:  [65-86] 79  Resp:  [16-26] 18  BP: (90)/(55) 90/55      Physical Exam   Gen: NAD, lying comfortably in bed  HEENT: NC/AT, MMM  Chest: symmetrical expansion, no costochondral tenderness on palpation  Lungs: CTA B/L, no w/r/r  CV: +S1, S2, RRR  Abdomen: S/NT/ND, + BS x 4  Ext: right arm in sling  Skin: dry, warm to touch   Neuro: CN II - XII intact, no focal motor or sensory deficits noted  Psych: A+O x 2, not oriented to time        Laboratory:  Recent Labs      10/31/18   2105   WBC  14.0*   RBC  4.79   HEMOGLOBIN  14.4   HEMATOCRIT  43.3   MCV  90.4   MCH  30.1   MCHC  33.3*   RDW  44.3   PLATELETCT  168   MPV  11.6     Recent Labs      10/31/18   2105   SODIUM  133*   POTASSIUM  4.1   CHLORIDE  102   CO2  22   GLUCOSE  172*   BUN  27*   CREATININE  1.12    CALCIUM  8.6     Recent Labs      10/31/18   2105   ALTSGPT  32   ASTSGOT  37   ALKPHOSPHAT  82   TBILIRUBIN  1.3   GLUCOSE  172*     Recent Labs      10/31/18   2105   APTT  43.9*   INR  3.45*             Recent Labs      10/31/18   2105   TROPONINI  <0.02       Urinalysis:    Recent Labs      10/31/18   2036   SPECGRAVITY  1.025   GLUCOSEUR  100*   KETONES  Negative   NITRITE  Negative   LEUKESTERAS  Negative        Imaging:    10/31/2018 9:05 PM    HISTORY/REASON FOR EXAM: Pain/Deformity Following Trauma    TECHNIQUE/EXAM DESCRIPTION:  AP and lateral views of the RIGHT shoulder.    COMPARISON:  None.    FINDINGS:    Right clavicular head and neck fracture is seen.   Impression         1.  Right clavicular head and neck fracture.         10/31/2018 9:30 PM    HISTORY/REASON FOR EXAM: Pain Following Trauma    TECHNIQUE/EXAM DESCRIPTION:  Single AP view of the chest.    COMPARISON: July 6, 2018    FINDINGS:    Overlying cardiac leads are present. Cardiomegaly is observed.    Atherosclerotic calcification of the aorta is noted.  The central pulmonary vasculature appears normal.    The lungs appear well expanded bilaterally.  Hazy interstitial bilateral pulmonary opacities are seen.    No significant pleural effusions are identified.    The bony structures appear age-appropriate.   Impression         1.  Interstitial edema or infiltrates versus chronic underlying lung disease, similar to prior study.  2.  Cardiomegaly  3.  Atherosclerosis           10/31/2018 9:03 PM    HISTORY/REASON FOR EXAM: Head Injury    TECHNIQUE/EXAM DESCRIPTION:  CT of the head without contrast.    Sequential axial images were obtained from the vertex to the skull base without contrast.    Up to date radiation dose reduction adjustments have been utilized to meet ALARA standards for radiation dose reduction.    COMPARISON:  May 15, 2018    FINDINGS:    There is moderate diffuse parenchymal volume loss observed. Periventricular and  subcortical white matter low-attenuation changes are seen, most commonly associated with small vessel ischemic disease. Moderate bilateral ventricular dilatation is seen,   with radiographic appearance favoring ex vacuo dilatation. No space occupying lesions or areas of acute vascular territory infarctions are identified. There are no abnormal extra axial fluid collections or extra axial hemorrhage identified.    The visualized paranasal sinuses and mastoid air cells are well aerated bilaterally. No depressed calvarial fractures are identified. The visualized globes and retrobulbar soft tissues appear within normal limits.  Atherosclerotic intracranial   calcifications are seen.   Impression         1.  No acute intracranial abnormality is identified, nonspecific changes, most commonly associated with small vessel ischemic disease. Associated moderate cerebral atrophy.  2.  Atherosclerosis.         Assessment/Plan:      Generalized weakness- (present on admission)   Assessment & Plan    Worsening weakness but no evidence of CVA. CT head is negative. PT/OT ordered        Renal insufficiency   Assessment & Plan    Started on mild IV fluid hydration        Closed right clavicular fracture   Assessment & Plan    S/p ground level fall yesterday. Patient will continue wearing sling. Norco prn pain        Closed head injury   Assessment & Plan    During fall, hit the right side of his head. On Coumadin but no evidence of bleed on CT head. Neuro checks Q4 hrs.         Type 2 diabetes mellitus without complication, without long-term current use of insulin (HCC)- (present on admission)   Assessment & Plan    Continue metformin. Started on insulin sliding scale        Permanent atrial fibrillation (HCC)- (present on admission)   Assessment & Plan    Currently, rate controlled. Continue metoprolol. Hold Coumadin as INR is 3.45.         Symptomatic bradycardia- (present on admission)   Assessment & Plan    History of pacemaker  placement

## 2018-11-01 NOTE — THERAPY
"Physical Therapy Evaluation completed.   Bed Mobility:  Supine to Sit: Maximal Assist  Transfers: Sit to Stand: Maximal Assist  Gait: Level Of Assist: Moderate Assist with Marck-Walker  X 2 steps     Plan of Care: Will benefit from Physical Therapy 4 times per week  Discharge Recommendations: Equipment: Will Continue to Assess for Equipment Needs. Post-acute therapy Discharge to a transitional care facility for continued skilled therapy services.    See \"Rehab Therapy-Acute\" Patient Summary Report for complete documentation.   Pt is a 78 y.o.m. referred to PT secondary to weakness and functional decline with GLF.  Pt lives in an [facility].  The pt uses a fww for mobility.  Pt states he was sitting in a chair in the dining room.  The chair tipped over, and he fell and hit his head.  The pt was experiencing increased weakness prior to the fall.  The pt presents with a flat affect.  He is oriented to place, time, name.  He is able to follow simple cues.  Pt presents with good LE strength, but he demonstrated impaired wt shift with sit to stand and in stance.  Pt requires mod assist to maintain balance with static stance with hemiwalker.  Pt is not at his PLOF and would benefit from cont skilled PT in acute care setting to increase strength, improve mobility, improve balance and improve gait.  "

## 2018-11-01 NOTE — ED NOTES
Pt bib [facility] with c/o rt shoulder pain following fall while eating dinner. Pt hit head and rt shoulder. Pt had one bout of emesis prior to EMS arrival, and one bout with EMS. Pt given 4mg IV zofran. Upon arrival it is found that pt is on coumadin. Pt taken to stat CT with ERP. Pt noted to have low O2 sat and placed on 2L NC.

## 2018-11-01 NOTE — PROGRESS NOTES
Spoke to patient's daughter RitaASPEN and updated on patient status. Daughter states that she is okay with patient going to SNF for further assistance and is available to speak to SW at any time. [place] and unfortunately can not make the trip to [place] at this time, but is always available by phone. She stated they have used [facility] [place] in the past- and she would like options as far as other places in the area as well. MD updated.

## 2018-11-02 PROBLEM — E51.2 WERNICKE ENCEPHALOPATHY SYNDROME: Status: ACTIVE | Noted: 2018-07-03

## 2018-11-02 LAB
GLUCOSE BLD-MCNC: 121 MG/DL (ref 65–99)
GLUCOSE BLD-MCNC: 140 MG/DL (ref 65–99)
GLUCOSE BLD-MCNC: 91 MG/DL (ref 65–99)
GLUCOSE BLD-MCNC: 98 MG/DL (ref 65–99)
INR PPP: 2.48 (ref 0.87–1.13)
PROTHROMBIN TIME: 26.5 SEC (ref 12–14.6)

## 2018-11-02 PROCEDURE — 82962 GLUCOSE BLOOD TEST: CPT | Mod: 91

## 2018-11-02 PROCEDURE — 700102 HCHG RX REV CODE 250 W/ 637 OVERRIDE(OP): Performed by: FAMILY MEDICINE

## 2018-11-02 PROCEDURE — 99233 SBSQ HOSP IP/OBS HIGH 50: CPT | Mod: 25 | Performed by: HOSPITALIST

## 2018-11-02 PROCEDURE — A9270 NON-COVERED ITEM OR SERVICE: HCPCS | Performed by: FAMILY MEDICINE

## 2018-11-02 PROCEDURE — 36415 COLL VENOUS BLD VENIPUNCTURE: CPT

## 2018-11-02 PROCEDURE — 700105 HCHG RX REV CODE 258: Performed by: FAMILY MEDICINE

## 2018-11-02 PROCEDURE — 770006 HCHG ROOM/CARE - MED/SURG/GYN SEMI*

## 2018-11-02 PROCEDURE — 85610 PROTHROMBIN TIME: CPT

## 2018-11-02 PROCEDURE — 99358 PROLONG SERVICE W/O CONTACT: CPT | Performed by: HOSPITALIST

## 2018-11-02 RX ADMIN — METFORMIN HYDROCHLORIDE 500 MG: 500 TABLET ORAL at 17:03

## 2018-11-02 RX ADMIN — SODIUM CHLORIDE: 9 INJECTION, SOLUTION INTRAVENOUS at 01:00

## 2018-11-02 RX ADMIN — HYDROCODONE BITARTRATE AND ACETAMINOPHEN 1 TABLET: 5; 325 TABLET ORAL at 02:12

## 2018-11-02 RX ADMIN — MIRTAZAPINE 7.5 MG: 15 TABLET, ORALLY DISINTEGRATING ORAL at 21:57

## 2018-11-02 RX ADMIN — METOPROLOL TARTRATE 50 MG: 50 TABLET ORAL at 17:03

## 2018-11-02 RX ADMIN — CALCIUM CARBONATE-VITAMIN D TAB 500 MG-200 UNIT 1 TABLET: 500-200 TAB at 06:29

## 2018-11-02 RX ADMIN — SODIUM CHLORIDE: 9 INJECTION, SOLUTION INTRAVENOUS at 13:20

## 2018-11-02 RX ADMIN — METOPROLOL TARTRATE 50 MG: 50 TABLET ORAL at 06:29

## 2018-11-02 RX ADMIN — QUETIAPINE FUMARATE 25 MG: 25 TABLET ORAL at 21:58

## 2018-11-02 RX ADMIN — ATORVASTATIN CALCIUM 10 MG: 10 TABLET, FILM COATED ORAL at 06:28

## 2018-11-02 RX ADMIN — METFORMIN HYDROCHLORIDE 500 MG: 500 TABLET ORAL at 07:53

## 2018-11-02 ASSESSMENT — ENCOUNTER SYMPTOMS
INSOMNIA: 0
BACK PAIN: 0
DIZZINESS: 0
SORE THROAT: 0
NAUSEA: 0
COUGH: 0
TINGLING: 0
HEADACHES: 0
PALPITATIONS: 0
DEPRESSION: 0
ABDOMINAL PAIN: 0
VOMITING: 0
EYE PAIN: 0
BLURRED VISION: 0
NECK PAIN: 0
CHILLS: 0
SHORTNESS OF BREATH: 0
FEVER: 0

## 2018-11-02 ASSESSMENT — PAIN SCALES - GENERAL
PAINLEVEL_OUTOF10: 1
PAINLEVEL_OUTOF10: 5
PAINLEVEL_OUTOF10: 0
PAINLEVEL_OUTOF10: 2
PAINLEVEL_OUTOF10: 0

## 2018-11-02 ASSESSMENT — PATIENT HEALTH QUESTIONNAIRE - PHQ9
SUM OF ALL RESPONSES TO PHQ9 QUESTIONS 1 AND 2: 0
1. LITTLE INTEREST OR PLEASURE IN DOING THINGS: NOT AT ALL
2. FEELING DOWN, DEPRESSED, IRRITABLE, OR HOPELESS: NOT AT ALL

## 2018-11-02 NOTE — CARE PLAN
Problem: Communication  Goal: The ability to communicate needs accurately and effectively will improve  Outcome: PROGRESSING AS EXPECTED  POC discussed with pt and white board updated. Call bell in reach.    Problem: Safety  Goal: Will remain free from injury  Outcome: PROGRESSING AS EXPECTED  Upper bed rails in place. Bed alarm on. Pt instructed to call for assistance OOB. Pt verbalizes understanding.    Problem: Venous Thromboembolism (VTW)/Deep Vein Thrombosis (DVT) Prevention:  Goal: Patient will participate in Venous Thrombosis (VTE)/Deep Vein Thrombosis (DVT)Prevention Measures  Outcome: PROGRESSING AS EXPECTED  Pt on coumadin for A. Fib. Coumadin currently on hold as pt INR 3.51.    Problem: Pain Management  Goal: Pain level will decrease to patient's comfort goal  Outcome: PROGRESSING AS EXPECTED  Pt denies pain. Pt states he only has pain when moving right arm. Right arm in sling. Will continue to monitor.    Problem: Urinary Elimination:  Goal: Ability to reestablish a normal urinary elimination pattern will improve  Outcome: PROGRESSING SLOWER THAN EXPECTED  Pt incontinent of urine. Pt encouraged to call when voiding.

## 2018-11-02 NOTE — DISCHARGE PLANNING
NATASHA spoke to Valery, she requested a SNF choice form be emailed to her.  NATASHA emailed pt the list.  NATASHA will f/u with her tomorrow.

## 2018-11-02 NOTE — ASSESSMENT & PLAN NOTE
NPH was ruled with neurosurgery in the past. This is unlikely to improve in the long term but short term may improve wiht treating other medical issues.  Continue remeron and seroquel.

## 2018-11-02 NOTE — PROGRESS NOTES
Patient received alert times 4, on room air, condom cath in place; draining dark yellow urine, iv access patent and infusing, full code, nka, no tele, sling noted to right arm, turn q2h, 1 assist with walker, labs reviewed, neuro q4h, compliant with all am medications, denies all pain, vitals wnl, will continue to monitor.

## 2018-11-02 NOTE — PROGRESS NOTES
[facility] Hospitalist Progress Note    Date of Service: 11/2/2018    Chief Complaint  78 y.o. male admitted 10/31/2018 with generalized weakness and a fall resulting in a right clavicular fracture.     Interval Problem Update  Still confused. Pain appears to be controlled.       Very long discussion with his daughter on the phone today. We discussed his previous polst. This apparently wsa done at a SNF when he was delirious. I do not believe this is a valid form. She indicated that he did not want aggressive care and indicated that he would want to be a DNR. Orders placed into the chart. She is his POA. A New polst will be filled out. We discussed his history of NPH and that he did not respond to a loarge volume csf tap. This makes his dementia and ataxia more consistent with Wernicke's encephalopathy. We also discussed coumadin in her father and the risks of this medication given his dementia and frequent falls. We discussed the risk/benefit of this vs stroke in afib and she has elected on my recommendation not to continue coumadin.   Discussion had from 1125-1232pm     Consultants/Specialty  none    Disposition  Needs snf. Order placed.         Review of Systems   Constitutional: Positive for malaise/fatigue. Negative for chills and fever.   HENT: Negative for sore throat.    Eyes: Negative for blurred vision and pain.   Respiratory: Negative for cough and shortness of breath.    Cardiovascular: Positive for chest pain. Negative for palpitations.   Gastrointestinal: Negative for abdominal pain, nausea and vomiting.   Genitourinary: Negative for dysuria and urgency.   Musculoskeletal: Positive for joint pain. Negative for back pain and neck pain.   Skin: Negative for itching and rash.   Neurological: Negative for dizziness, tingling and headaches.   Psychiatric/Behavioral: Negative for depression. The patient does not have insomnia.    All other systems reviewed and are negative.     Physical Exam  Laboratory/Imaging    Hemodynamics  Temp (24hrs), Av.8 °C (98.2 °F), Min:36.6 °C (97.9 °F), Max:36.9 °C (98.4 °F)   Temperature: 36.9 °C (98.4 °F)  Pulse  Av.1  Min: 60  Max: 86    Blood Pressure : (!) 93/54 (notified nurse)      Respiratory      Respiration: 18, Pulse Oximetry: 90 %, O2 Daily Delivery Respiratory : Room Air with O2 Available             Fluids    Intake/Output Summary (Last 24 hours) at 18 0929  Last data filed at 18 0600   Gross per 24 hour   Intake             1116 ml   Output                0 ml   Net             1116 ml       Nutrition  Orders Placed This Encounter   Procedures   • Diet Order Cardiac, Diabetic     Standing Status:   Standing     Number of Occurrences:   1     Order Specific Question:   Diet:     Answer:   Cardiac [6]     Order Specific Question:   Diet:     Answer:   Diabetic [3]     Physical Exam   Constitutional: He appears well-developed and well-nourished. No distress.   Patient seen and examined  Plan discussed with RN   HENT:   Right Ear: External ear normal.   Left Ear: External ear normal.   Nose: Nose normal.   Eyes: Right eye exhibits no discharge. Left eye exhibits no discharge. No scleral icterus.   Neck: No JVD present. No tracheal deviation present.   Cardiovascular: Normal rate, normal heart sounds and intact distal pulses.    No murmur heard.       Pulmonary/Chest: Effort normal and breath sounds normal. No respiratory distress. He has no rales.   Abdominal: Soft. Bowel sounds are normal. He exhibits no distension. There is no tenderness.   Musculoskeletal: He exhibits no edema or tenderness.   Neurological: He is alert.   Disorientated.   Confused at times.      Skin: Skin is warm and dry. He is not diaphoretic. No erythema.   Normal skin color   Psychiatric: He has a normal mood and affect. His behavior is normal.   Nursing note and vitals reviewed.      Recent Labs      10/31/18   2105   WBC  14.0*   RBC  4.79   HEMOGLOBIN  14.4   HEMATOCRIT  43.3   MCV   90.4   MCH  30.1   MCHC  33.3*   RDW  44.3   PLATELETCT  168   MPV  11.6     Recent Labs      10/31/18   2105   SODIUM  133*   POTASSIUM  4.1   CHLORIDE  102   CO2  22   GLUCOSE  172*   BUN  27*   CREATININE  1.12   CALCIUM  8.6     Recent Labs      10/31/18   2105  11/01/18   0636  11/02/18   0430   APTT  43.9*   --    --    INR  3.45*  3.51*  2.48*                  Assessment/Plan     Generalized weakness- (present on admission)   Assessment & Plan    Worsening over the past several years per daughter. Likely deconditioning 2/2 chronic debility from chronic etoh per daughter.         Acute prerenal azotemia   Assessment & Plan    Improving. Continue iv fluids.         Closed right clavicular fracture   Assessment & Plan    Pain control and immobilization.         Closed head injury   Assessment & Plan    No bleed. Neuro checks ongoing.         Wernicke encephalopathy syndrome- (present on admission)   Assessment & Plan    NPH was ruled with neurosurgery in the past. This is unlikely to improve in the long term but short term may improve wiht treating other medical issues.  Continue remeron and seroquel.         Type 2 diabetes mellitus without complication, without long-term current use of insulin (HCC)- (present on admission)   Assessment & Plan    Metformin and ssi        Permanent atrial fibrillation (HCC)- (present on admission)   Assessment & Plan    Continue tele  Reverse coumadin for likely LP          Symptomatic bradycardia- (present on admission)   Assessment & Plan    Has pacer          Quality-Core Measures   Reviewed items::  EKG reviewed, Radiology images reviewed, Labs reviewed and Medications reviewed  Cabello catheter::  No Cabello  DVT prophylaxis pharmacological::  Warfarin (Coumadin)  DVT prophylaxis - mechanical:  SCDs  Ulcer Prophylaxis::  Not indicated  Assessed for rehabilitation services:  Patient was assess for and/or received rehabilitation services during this hospitalization

## 2018-11-02 NOTE — PROGRESS NOTES
Report received by RUPERTO Rasmussen. Pt resting comfortably in bed at this time. Pt A&Ox4 with Q4H neuro checks due to fall with head laceration. Pt has not complaints at this time and denies any pain. Pt has sling in place to right arm for right clavicular fx. IVF infusing. All orders, labs and medications reviewed. POC discussed with pt and questions addressed. Bed locked, in low position with alarm on. Call bell in reach. Will continue to monitor.

## 2018-11-03 LAB
ALBUMIN SERPL BCP-MCNC: 2.9 G/DL (ref 3.2–4.9)
ALBUMIN/GLOB SERPL: 0.9 G/DL
ALP SERPL-CCNC: 92 U/L (ref 30–99)
ALT SERPL-CCNC: 28 U/L (ref 2–50)
ANION GAP SERPL CALC-SCNC: 8 MMOL/L (ref 0–11.9)
AST SERPL-CCNC: 23 U/L (ref 12–45)
BASOPHILS # BLD AUTO: 0.5 % (ref 0–1.8)
BASOPHILS # BLD: 0.04 K/UL (ref 0–0.12)
BILIRUB SERPL-MCNC: 1.5 MG/DL (ref 0.1–1.5)
BUN SERPL-MCNC: 14 MG/DL (ref 8–22)
CALCIUM SERPL-MCNC: 8.5 MG/DL (ref 8.4–10.2)
CHLORIDE SERPL-SCNC: 105 MMOL/L (ref 96–112)
CO2 SERPL-SCNC: 23 MMOL/L (ref 20–33)
CREAT SERPL-MCNC: 0.76 MG/DL (ref 0.5–1.4)
EOSINOPHIL # BLD AUTO: 0.13 K/UL (ref 0–0.51)
EOSINOPHIL NFR BLD: 1.7 % (ref 0–6.9)
ERYTHROCYTE [DISTWIDTH] IN BLOOD BY AUTOMATED COUNT: 45.9 FL (ref 35.9–50)
GLOBULIN SER CALC-MCNC: 3.2 G/DL (ref 1.9–3.5)
GLUCOSE BLD-MCNC: 103 MG/DL (ref 65–99)
GLUCOSE BLD-MCNC: 110 MG/DL (ref 65–99)
GLUCOSE BLD-MCNC: 130 MG/DL (ref 65–99)
GLUCOSE BLD-MCNC: 188 MG/DL (ref 65–99)
GLUCOSE SERPL-MCNC: 104 MG/DL (ref 65–99)
HCT VFR BLD AUTO: 39.4 % (ref 42–52)
HGB BLD-MCNC: 13.1 G/DL (ref 14–18)
IMM GRANULOCYTES # BLD AUTO: 0.02 K/UL (ref 0–0.11)
IMM GRANULOCYTES NFR BLD AUTO: 0.3 % (ref 0–0.9)
INR PPP: 1.27 (ref 0.87–1.13)
LYMPHOCYTES # BLD AUTO: 1.75 K/UL (ref 1–4.8)
LYMPHOCYTES NFR BLD: 23 % (ref 22–41)
MCH RBC QN AUTO: 30.5 PG (ref 27–33)
MCHC RBC AUTO-ENTMCNC: 33.2 G/DL (ref 33.7–35.3)
MCV RBC AUTO: 91.8 FL (ref 81.4–97.8)
MONOCYTES # BLD AUTO: 0.96 K/UL (ref 0–0.85)
MONOCYTES NFR BLD AUTO: 12.6 % (ref 0–13.4)
NEUTROPHILS # BLD AUTO: 4.71 K/UL (ref 1.82–7.42)
NEUTROPHILS NFR BLD: 61.9 % (ref 44–72)
NRBC # BLD AUTO: 0 K/UL
NRBC BLD-RTO: 0 /100 WBC
PLATELET # BLD AUTO: 160 K/UL (ref 164–446)
PMV BLD AUTO: 11.5 FL (ref 9–12.9)
POTASSIUM SERPL-SCNC: 3.6 MMOL/L (ref 3.6–5.5)
PROT SERPL-MCNC: 6.1 G/DL (ref 6–8.2)
PROTHROMBIN TIME: 15.8 SEC (ref 12–14.6)
RBC # BLD AUTO: 4.29 M/UL (ref 4.7–6.1)
SODIUM SERPL-SCNC: 136 MMOL/L (ref 135–145)
WBC # BLD AUTO: 7.6 K/UL (ref 4.8–10.8)

## 2018-11-03 PROCEDURE — 700102 HCHG RX REV CODE 250 W/ 637 OVERRIDE(OP): Performed by: HOSPITALIST

## 2018-11-03 PROCEDURE — 36415 COLL VENOUS BLD VENIPUNCTURE: CPT

## 2018-11-03 PROCEDURE — 85610 PROTHROMBIN TIME: CPT

## 2018-11-03 PROCEDURE — 85025 COMPLETE CBC W/AUTO DIFF WBC: CPT

## 2018-11-03 PROCEDURE — 80053 COMPREHEN METABOLIC PANEL: CPT

## 2018-11-03 PROCEDURE — A9270 NON-COVERED ITEM OR SERVICE: HCPCS | Performed by: HOSPITALIST

## 2018-11-03 PROCEDURE — A9270 NON-COVERED ITEM OR SERVICE: HCPCS | Performed by: FAMILY MEDICINE

## 2018-11-03 PROCEDURE — 700102 HCHG RX REV CODE 250 W/ 637 OVERRIDE(OP): Performed by: FAMILY MEDICINE

## 2018-11-03 PROCEDURE — 770006 HCHG ROOM/CARE - MED/SURG/GYN SEMI*

## 2018-11-03 PROCEDURE — 82962 GLUCOSE BLOOD TEST: CPT | Mod: 91

## 2018-11-03 PROCEDURE — 700105 HCHG RX REV CODE 258: Performed by: FAMILY MEDICINE

## 2018-11-03 PROCEDURE — 99232 SBSQ HOSP IP/OBS MODERATE 35: CPT | Performed by: HOSPITALIST

## 2018-11-03 RX ORDER — POTASSIUM CHLORIDE 20 MEQ/1
40 TABLET, EXTENDED RELEASE ORAL ONCE
Status: COMPLETED | OUTPATIENT
Start: 2018-11-03 | End: 2018-11-03

## 2018-11-03 RX ADMIN — METFORMIN HYDROCHLORIDE 500 MG: 500 TABLET ORAL at 11:23

## 2018-11-03 RX ADMIN — CALCIUM CARBONATE-VITAMIN D TAB 500 MG-200 UNIT 1 TABLET: 500-200 TAB at 05:32

## 2018-11-03 RX ADMIN — METOPROLOL TARTRATE 50 MG: 50 TABLET ORAL at 05:32

## 2018-11-03 RX ADMIN — ATORVASTATIN CALCIUM 10 MG: 10 TABLET, FILM COATED ORAL at 05:32

## 2018-11-03 RX ADMIN — SODIUM CHLORIDE: 9 INJECTION, SOLUTION INTRAVENOUS at 23:54

## 2018-11-03 RX ADMIN — SODIUM CHLORIDE: 9 INJECTION, SOLUTION INTRAVENOUS at 05:34

## 2018-11-03 RX ADMIN — MIRTAZAPINE 7.5 MG: 15 TABLET, ORALLY DISINTEGRATING ORAL at 22:07

## 2018-11-03 RX ADMIN — POTASSIUM CHLORIDE 40 MEQ: 1500 TABLET, EXTENDED RELEASE ORAL at 11:15

## 2018-11-03 RX ADMIN — QUETIAPINE FUMARATE 25 MG: 25 TABLET ORAL at 22:07

## 2018-11-03 ASSESSMENT — ENCOUNTER SYMPTOMS
INSOMNIA: 0
NAUSEA: 0
CHILLS: 0
SHORTNESS OF BREATH: 0
PALPITATIONS: 0
ABDOMINAL PAIN: 0
BLURRED VISION: 0
BACK PAIN: 0
EYE PAIN: 0
NECK PAIN: 0
COUGH: 0
DEPRESSION: 0
DIZZINESS: 0
FEVER: 0
TINGLING: 0
SORE THROAT: 0

## 2018-11-03 ASSESSMENT — PAIN SCALES - GENERAL
PAINLEVEL_OUTOF10: 0
PAINLEVEL_OUTOF10: 0

## 2018-11-03 NOTE — DISCHARGE PLANNING
Received Choice form at [phone]  Agency/Facility Name: [facility]  Referral sent per Choice form @ [phone]

## 2018-11-03 NOTE — DISCHARGE PLANNING
Care Transition Team Assessment  NATASHA spoke to pt's dtrRita.  She chose [facility].  NATASHA faxed choice to [facility].      Information Source Rita Barajas  Orientation : Oriented x 4  Who is responsible for making decisions for patient? : Adult child  Name(s) of Primary Decision Maker: Rita Barajas    Elopement Risk  Legal Hold: No  Ambulatory or Self Mobile in Wheelchair: No-Not an Elopement Risk  Disoriented: No  Psychiatric Symptoms: None  History of Wandering: No  Elopement this Admit: No  Vocalizing Wanting to Leave: No  Displays Behaviors, Body Language Wanting to Leave: No-Not at Risk for Elopement  Elopement Risk: Not at Risk for Elopement    Interdisciplinary Discharge Planning  Does Admitting Nurse Feel This Could be a Complex Discharge?: No  Lives with - Patient's Self Care Capacity: Attendant / Paid Care Giver  Patient or legal guardian wants to designate a caregiver (see row info): No  Support Systems: Family Member(s)  Housing / Facility: Assisted Living Residence  Do You Take your Prescribed Medications Regularly: Yes  Able to Return to Previous ADL's: Yes  Mobility Issues: Yes  Prior Services: None  Assistance Needed: Yes  Durable Medical Equipment: Walker    Discharge Preparedness  What is your plan after discharge?: Skilled nursing facility  What are your discharge supports?: Child  Prior Functional Level: Ambulatory, Uses Walker    Functional Assesment  Prior Functional Level: Ambulatory, Uses Walker    Finances  Financial Barriers to Discharge: No    Vision / Hearing Impairment  Vision Impairment : Yes  Right Eye Vision: Impaired, Wears Glasses  Left Eye Vision: Impaired, Wears Glasses  Hearing Impairment : No    Values / Beliefs / Concerns  Values / Beliefs Concerns : No    Advance Directive  Advance Directive?: DPOA for Health Care  Durable Power of  Name and Contact : Rita Barajas    Domestic Abuse  Have you ever been the victim of abuse or  violence?: No    Discharge Risks or Barriers  Discharge risks or barriers?: No    Anticipated Discharge Information  Anticipated discharge disposition: SNF

## 2018-11-03 NOTE — PROGRESS NOTES
[facility]    Date of Service: 11/3/2018    Chief Complaint  78 y.o. male admitted 10/31/2018 with generalized weakness and a fall resulting in a right clavicular fracture.     Interval Problem Update  A littler less confused today. Stable.      Consultants/Specialty  none    Disposition  Pending SNF        Review of Systems   Constitutional: Negative for chills and fever.   HENT: Negative for sore throat.    Eyes: Negative for blurred vision and pain.   Respiratory: Negative for cough and shortness of breath.    Cardiovascular: Negative for chest pain and palpitations.   Gastrointestinal: Negative for abdominal pain and nausea.   Genitourinary: Negative for dysuria and urgency.   Musculoskeletal: Positive for joint pain. Negative for back pain and neck pain.   Skin: Negative for itching and rash.   Neurological: Negative for dizziness and tingling.   Psychiatric/Behavioral: Negative for depression. The patient does not have insomnia.    All other systems reviewed and are negative.     Physical Exam  Laboratory/Imaging   Hemodynamics  Temp (24hrs), Av.1 °C (98.8 °F), Min:36.8 °C (98.3 °F), Max:37.3 °C (99.2 °F)   Temperature: 37.3 °C (99.2 °F)  Pulse  Av.3  Min: 60  Max: 92    Blood Pressure : 126/73      Respiratory      Respiration: 18, Pulse Oximetry: 92 %             Fluids    Intake/Output Summary (Last 24 hours) at 18 0925  Last data filed at 18 0545   Gross per 24 hour   Intake              340 ml   Output             1450 ml   Net            -1110 ml       Nutrition  Orders Placed This Encounter   Procedures   • Diet Order Cardiac, Diabetic     Standing Status:   Standing     Number of Occurrences:   1     Order Specific Question:   Diet:     Answer:   Cardiac [6]     Order Specific Question:   Diet:     Answer:   Diabetic [3]     Physical Exam   Constitutional: He appears well-developed and well-nourished. No distress.   Patient seen and examined  Plan discussed  with RN   HENT:   Right Ear: External ear normal.   Left Ear: External ear normal.   Nose: Nose normal.   Eyes: Right eye exhibits no discharge. Left eye exhibits no discharge. No scleral icterus.   Neck: No JVD present. No tracheal deviation present.   Cardiovascular: Normal rate, normal heart sounds and intact distal pulses.    No murmur heard.       Pulmonary/Chest: Effort normal and breath sounds normal. No respiratory distress. He has no rales.   Abdominal: Soft. Bowel sounds are normal. He exhibits no distension. There is no tenderness.   Musculoskeletal: He exhibits no edema or tenderness.   Neurological: He is alert.   Disorientated.    Skin: Skin is warm and dry. He is not diaphoretic. No erythema.   Psychiatric: He has a normal mood and affect. His behavior is normal.   Nursing note and vitals reviewed.      Recent Labs      10/31/18   2105  11/03/18   0430   WBC  14.0*  7.6   RBC  4.79  4.29*   HEMOGLOBIN  14.4  13.1*   HEMATOCRIT  43.3  39.4*   MCV  90.4  91.8   MCH  30.1  30.5   MCHC  33.3*  33.2*   RDW  44.3  45.9   PLATELETCT  168  160*   MPV  11.6  11.5     Recent Labs      10/31/18   2105  11/03/18   0430   SODIUM  133*  136   POTASSIUM  4.1  3.6   CHLORIDE  102  105   CO2  22  23   GLUCOSE  172*  104*   BUN  27*  14   CREATININE  1.12  0.76   CALCIUM  8.6  8.5     Recent Labs      10/31/18   2105  11/01/18   0636  11/02/18 0430 11/03/18   0430   APTT  43.9*   --    --    --    INR  3.45*  3.51*  2.48*  1.27*                  Assessment/Plan     Generalized weakness- (present on admission)   Assessment & Plan    Worsening over the past several years per daughter. Likely deconditioning 2/2 chronic debility from chronic etoh per daughter.         Acute prerenal azotemia   Assessment & Plan    Improving. Continue iv fluids.         Closed right clavicular fracture   Assessment & Plan    Pain control and immobilization.         Closed head injury   Assessment & Plan    No bleed. Neuro checks ongoing.          Wernicke encephalopathy syndrome- (present on admission)   Assessment & Plan    NPH was ruled with neurosurgery in the past. This is unlikely to improve in the long term but short term may improve wiht treating other medical issues.  Continue remeron and seroquel.         Type 2 diabetes mellitus without complication, without long-term current use of insulin (HCC)- (present on admission)   Assessment & Plan    Metformin and ssi        Permanent atrial fibrillation (HCC)- (present on admission)   Assessment & Plan    Continue tele  Reverse coumadin for likely LP          Symptomatic bradycardia- (present on admission)   Assessment & Plan    Has pacer          Quality-Core Measures   Reviewed items::  EKG reviewed, Radiology images reviewed, Labs reviewed and Medications reviewed  Cabello catheter::  No Cabello  DVT prophylaxis pharmacological::  Warfarin (Coumadin)  DVT prophylaxis - mechanical:  SCDs  Ulcer Prophylaxis::  Not indicated  Assessed for rehabilitation services:  Patient was assess for and/or received rehabilitation services during this hospitalization

## 2018-11-04 VITALS
TEMPERATURE: 98.3 F | BODY MASS INDEX: 29.27 KG/M2 | OXYGEN SATURATION: 91 % | DIASTOLIC BLOOD PRESSURE: 76 MMHG | WEIGHT: 193.12 LBS | HEART RATE: 70 BPM | SYSTOLIC BLOOD PRESSURE: 113 MMHG | RESPIRATION RATE: 16 BRPM | HEIGHT: 68 IN

## 2018-11-04 LAB
GLUCOSE BLD-MCNC: 115 MG/DL (ref 65–99)
GLUCOSE BLD-MCNC: 206 MG/DL (ref 65–99)
INR PPP: 1.27 (ref 0.87–1.13)
PROTHROMBIN TIME: 15.8 SEC (ref 12–14.6)

## 2018-11-04 PROCEDURE — 99239 HOSP IP/OBS DSCHRG MGMT >30: CPT | Performed by: HOSPITALIST

## 2018-11-04 PROCEDURE — A9270 NON-COVERED ITEM OR SERVICE: HCPCS | Performed by: FAMILY MEDICINE

## 2018-11-04 PROCEDURE — 82962 GLUCOSE BLOOD TEST: CPT | Mod: 91

## 2018-11-04 PROCEDURE — A9270 NON-COVERED ITEM OR SERVICE: HCPCS | Performed by: HOSPITALIST

## 2018-11-04 PROCEDURE — 85610 PROTHROMBIN TIME: CPT

## 2018-11-04 PROCEDURE — 700102 HCHG RX REV CODE 250 W/ 637 OVERRIDE(OP): Performed by: HOSPITALIST

## 2018-11-04 PROCEDURE — 700102 HCHG RX REV CODE 250 W/ 637 OVERRIDE(OP): Performed by: FAMILY MEDICINE

## 2018-11-04 PROCEDURE — 36415 COLL VENOUS BLD VENIPUNCTURE: CPT

## 2018-11-04 RX ORDER — ACETAMINOPHEN 325 MG/1
650 TABLET ORAL EVERY 6 HOURS PRN
Qty: 30 TAB | Refills: 0 | Status: SHIPPED | OUTPATIENT
Start: 2018-11-04 | End: 2018-12-18

## 2018-11-04 RX ADMIN — CALCIUM CARBONATE-VITAMIN D TAB 500 MG-200 UNIT 1 TABLET: 500-200 TAB at 06:14

## 2018-11-04 RX ADMIN — METOPROLOL TARTRATE 50 MG: 50 TABLET ORAL at 06:14

## 2018-11-04 RX ADMIN — METFORMIN HYDROCHLORIDE 500 MG: 500 TABLET ORAL at 09:30

## 2018-11-04 RX ADMIN — INSULIN HUMAN 2 UNITS: 100 INJECTION, SOLUTION PARENTERAL at 12:04

## 2018-11-04 RX ADMIN — ATORVASTATIN CALCIUM 10 MG: 10 TABLET, FILM COATED ORAL at 06:14

## 2018-11-04 ASSESSMENT — PAIN SCALES - GENERAL: PAINLEVEL_OUTOF10: 0

## 2018-11-04 NOTE — DISCHARGE PLANNING
Anticipated Discharge Disposition: SNF    Action: COBRA packet completed. Pt unable to sign d/t having his arm in a sling. Provided verbal for COBRA and IMM.   LSW called pt's daughter Rita to notify her of the transfer.     Barriers to Discharge: None    Plan: Transfer to [facility]

## 2018-11-04 NOTE — DISCHARGE INSTRUCTIONS
Discharge Instructions    Discharged to group home by medical transportation with escort. Discharged via wheelchair, hospital escort: Yes.  Special equipment needed: Not Applicable and Wheelchair    Be sure to schedule a follow-up appointment with your primary care doctor or any specialists as instructed.     Discharge Plan:   Diet Plan: Discussed  Activity Level: Discussed  Confirmed Follow up Appointment: Appointment Scheduled  Confirmed Symptoms Management: Discussed  Medication Reconciliation Updated: Yes  Influenza Vaccine Indication: Patient Refuses    I understand that a diet low in cholesterol, fat, and sodium is recommended for good health. Unless I have been given specific instructions below for another diet, I accept this instruction as my diet prescription.   Other diet: Regular    Special Instructions: None    · Is patient discharged on Warfarin / Coumadin?   No     Depression / Suicide Risk    As you are discharged from this [facility] facility, it is important to learn how to keep safe from harming yourself.    Recognize the warning signs:  · Abrupt changes in personality, positive or negative- including increase in energy   · Giving away possessions  · Change in eating patterns- significant weight changes-  positive or negative  · Change in sleeping patterns- unable to sleep or sleeping all the time   · Unwillingness or inability to communicate  · Depression  · Unusual sadness, discouragement and loneliness  · Talk of wanting to die  · Neglect of personal appearance   · Rebelliousness- reckless behavior  · Withdrawal from people/activities they love  · Confusion- inability to concentrate     If you or a loved one observes any of these behaviors or has concerns about self-harm, here's what you can do:  · Talk about it- your feelings and reasons for harming yourself  · Remove any means that you might use to hurt yourself (examples: pills, rope, extension cords, firearm)  · Get professional help from  the community (Mental Health, Substance Abuse, psychological counseling)  · Do not be alone:Call your Safe Contact- someone whom you trust who will be there for you.  · Call your local CRISIS HOTLINE 318-4611 or 838-291-1660  · Call your local Children's Mobile Crisis Response Team [place] (717) 224-6369 or www.[employer]Gene.us  · Call the toll free National Suicide Prevention Hotlines   · National Suicide Prevention Lifeline 721-733-NKIS (3691)  · National Hope Line Network 800-SUICIDE (740-7399)

## 2018-11-04 NOTE — DISCHARGE PLANNING
Agency/Facility Name: [facility]  Spoke To: Perfecto  Outcome: He will speak with the DON on bed availability and return call.

## 2018-11-04 NOTE — DISCHARGE PLANNING
Anticipated Discharge Disposition: SNF    Action: Notified by MD pt is cleared for SNF. Transport form completed and faxed to [facility]    Barriers to Discharge: None    Plan: Transfer to SNF

## 2018-11-04 NOTE — CARE PLAN
Problem: Safety  Goal: Will remain free from falls  Outcome: PROGRESSING AS EXPECTED    Intervention: Implement fall precautions  Room/floor clear. Non skid socks on. Proper signs up. Bed alarm on, bed low and locked.  Call light and belongings within reach.  Hourly rounding in place to make sure needs are met.      Problem: Skin Integrity  Goal: Risk for impaired skin integrity will decrease  Outcome: PROGRESSING AS EXPECTED    Intervention: Implement precautions to protect skin integrity in collaboration with the interdisciplinary team  Assess skin qshift for skin integrity and breakdown. Turn q2hrs

## 2018-11-04 NOTE — PROGRESS NOTES
Pt sitting up in bed. Head-to-toe completed. POC updated    A&Ox3. With the exception of small tremors in hands while doing Neuro exam. There are no other symptoms of any change in LOC or increasing weakness.    Discharge orders received. Will continue to monitor

## 2018-11-04 NOTE — DISCHARGE PLANNING
Received Transport Form @ *3766  Spoke to Perfecto @ [facility]    Transport is scheduled for 11-04-18 @1400 going to [facility]..  Discharge Summary has been faxed to [facility] at [phone].

## 2018-11-04 NOTE — DISCHARGE SUMMARY
Discharge Summary    CHIEF COMPLAINT ON ADMISSION  Chief Complaint   Patient presents with   • Shoulder Pain     Pt was eating dinner and fell out of his chair to the rt side. pt has rt shoulder pain   • T-5000 FALL     denies any headache.    • Weakness     Pt has had increasing weakness all day increasing from standby assist to a 2 person assist.        Reason for Admission  Generalized weakness     CODE STATUS  DNAR/DNI    HPI & HOSPITAL COURSE  This is a 78 y.o. male here with generalized weakness and a ground level fall. He was found to have a right clavicular fracture this is nonoperative. He has a history of dementia and alcoholism and has clinical evidence of Wernicke's encephalopathy. He will be discharge [facility] for ongoing physical therapy. I have had extensive discussions with his daughter who is his POA. A polst form has been filled out and sent to her. She will be signing it and returning it to his assisted living facility. His current wishes are a DNR with the ability to treat acute illnesses. No feeding tubes. She is strongly considering Hospice and comfort care in the near future.         Therefore, he is discharged in good and stable condition to home with close outpatient follow-up.    The patient met 2-midnight criteria for an inpatient stay at the time of discharge.      FOLLOW UP ITEMS POST DISCHARGE  none    DISCHARGE DIAGNOSES  Active Problems:    Generalized weakness POA: Yes    Closed head injury POA: Unknown    Closed right clavicular fracture POA: Unknown    Acute prerenal azotemia POA: Unknown    Symptomatic bradycardia POA: Yes    Permanent atrial fibrillation (HCC) POA: Yes    Type 2 diabetes mellitus without complication, without long-term current use of insulin (HCC) POA: Yes    Wernicke encephalopathy syndrome POA: Yes  Resolved Problems:    * No resolved hospital problems. *      FOLLOW UP  No future appointments.  No follow-up provider specified.    MEDICATIONS ON DISCHARGE      Medication List      START taking these medications      Instructions   acetaminophen 325 MG Tabs  Commonly known as:  TYLENOL   Take 2 Tabs by mouth every 6 hours as needed (Mild Pain; (Pain scale 1-3); Temp greater than 100.5 F).  Dose:  650 mg        CONTINUE taking these medications      Instructions   atorvastatin 10 MG Tabs  Commonly known as:  LIPITOR   Take 10 mg by mouth every day.  Dose:  10 mg     Calcium 600 + D 600-400 MG-UNIT Tabs  Generic drug:  Calcium Carbonate-Vitamin D   Take 1 Tab by mouth every day.  Dose:  1 Tab     metFORMIN 500 MG Tabs  Commonly known as:  GLUCOPHAGE   Take 500 mg by mouth 2 times a day, with meals.  Dose:  500 mg     metoprolol 50 MG Tabs  Commonly known as:  LOPRESSOR   Take 1 Tab by mouth 2 times a day.  Dose:  50 mg     mirtazapine 15 MG Tabs  Commonly known as:  REMERON   Take 7.5 mg by mouth every evening.  Dose:  7.5 mg     QUEtiapine 25 MG Tabs  Commonly known as:  SEROQUEL   Take 25 mg by mouth every bedtime.  Dose:  25 mg        STOP taking these medications    warfarin 1 MG Tabs  Commonly known as:  COUMADIN            Allergies  No Known Allergies    DIET  Orders Placed This Encounter   Procedures   • Diet Order Cardiac, Diabetic     Standing Status:   Standing     Number of Occurrences:   1     Order Specific Question:   Diet:     Answer:   Cardiac [6]     Order Specific Question:   Diet:     Answer:   Diabetic [3]       ACTIVITY  As tolerated.  Weight bearing as tolerated    LINES, DRAINS, AND WOUNDS  This is an automated list. Peripheral IVs will be removed prior to discharge.  Peripheral IV 10/31/18 18 G Right Forearm (Active)   Site Assessment Clean;Dry;Intact 11/3/2018  7:35 PM   Dressing Type Occlusive;Transparent 11/3/2018  7:35 PM   Line Status Infusing 11/3/2018  7:35 PM   Dressing Status Clean;Dry;Intact 11/3/2018  7:35 PM   Dressing Intervention N/A 11/3/2018  7:35 PM   Date Primary Tubing Changed 10/31/18 11/3/2018  7:35 PM   NEXT Primary Tubing  Change  11/04/18 11/3/2018  7:35 PM   Infiltration Grading ([facility]) 0 11/3/2018  7:35 PM   Phlebitis Scale ([facility] Only) 0 11/3/2018  7:35 PM          Peripheral IV 10/31/18 18 G Right Forearm (Active)   Site Assessment Clean;Dry;Intact 11/3/2018  7:35 PM   Dressing Type Occlusive;Transparent 11/3/2018  7:35 PM   Line Status Infusing 11/3/2018  7:35 PM   Dressing Status Clean;Dry;Intact 11/3/2018  7:35 PM   Dressing Intervention N/A 11/3/2018  7:35 PM   Date Primary Tubing Changed 10/31/18 11/3/2018  7:35 PM   NEXT Primary Tubing Change  11/04/18 11/3/2018  7:35 PM   Infiltration Grading ([facility]) 0 11/3/2018  7:35 PM   Phlebitis Scale ([facility] Only) 0 11/3/2018  7:35 PM               MENTAL STATUS ON TRANSFER  Level of Consciousness: Alert  Orientation : Oriented x 4  Speech: Speech Clear    CONSULTATIONS  none    PROCEDURES  none    LABORATORY  Lab Results   Component Value Date    SODIUM 136 11/03/2018    POTASSIUM 3.6 11/03/2018    CHLORIDE 105 11/03/2018    CO2 23 11/03/2018    GLUCOSE 104 (H) 11/03/2018    BUN 14 11/03/2018    CREATININE 0.76 11/03/2018        Lab Results   Component Value Date    WBC 7.6 11/03/2018    HEMOGLOBIN 13.1 (L) 11/03/2018    HEMATOCRIT 39.4 (L) 11/03/2018    PLATELETCT 160 (L) 11/03/2018        Total time of the discharge process exceeds 35 minutes.

## 2018-11-05 NOTE — DISCHARGE PLANNING
NATASHA mailed pt's POLST form to Rita langford.     [address] [place] [address]    Rita stated that she will provide a copy to the pt's PCP and to [facility].  She will send the original to pt's RADHA, Towanda Allegra.

## 2018-12-18 ENCOUNTER — HOSPITAL ENCOUNTER (INPATIENT)
Facility: MEDICAL CENTER | Age: 78
LOS: 4 days | DRG: 726 | End: 2018-12-22
Attending: EMERGENCY MEDICINE | Admitting: INTERNAL MEDICINE
Payer: MEDICARE

## 2018-12-18 ENCOUNTER — APPOINTMENT (OUTPATIENT)
Dept: RADIOLOGY | Facility: MEDICAL CENTER | Age: 78
DRG: 726 | End: 2018-12-18
Attending: EMERGENCY MEDICINE
Payer: MEDICARE

## 2018-12-18 DIAGNOSIS — F03.90 DEMENTIA WITHOUT BEHAVIORAL DISTURBANCE, UNSPECIFIED DEMENTIA TYPE: ICD-10-CM

## 2018-12-18 DIAGNOSIS — R33.9 URINARY RETENTION: ICD-10-CM

## 2018-12-18 PROBLEM — D69.6 THROMBOCYTOPENIA (HCC): Status: ACTIVE | Noted: 2018-12-18

## 2018-12-18 PROBLEM — I82.409 DEEP VEIN THROMBOSIS (DVT) (HCC): Status: ACTIVE | Noted: 2018-12-18

## 2018-12-18 LAB
ALBUMIN SERPL BCP-MCNC: 3.6 G/DL (ref 3.2–4.9)
ALBUMIN/GLOB SERPL: 1.2 G/DL
ALP SERPL-CCNC: 102 U/L (ref 30–99)
ALT SERPL-CCNC: 21 U/L (ref 2–50)
ANION GAP SERPL CALC-SCNC: 8 MMOL/L (ref 0–11.9)
APPEARANCE UR: CLEAR
AST SERPL-CCNC: 22 U/L (ref 12–45)
BASOPHILS # BLD AUTO: 0.6 % (ref 0–1.8)
BASOPHILS # BLD: 0.05 K/UL (ref 0–0.12)
BILIRUB SERPL-MCNC: 0.9 MG/DL (ref 0.1–1.5)
BILIRUB UR QL STRIP.AUTO: NEGATIVE
BUN SERPL-MCNC: 20 MG/DL (ref 8–22)
CALCIUM SERPL-MCNC: 8.5 MG/DL (ref 8.4–10.2)
CHLORIDE SERPL-SCNC: 105 MMOL/L (ref 96–112)
CO2 SERPL-SCNC: 25 MMOL/L (ref 20–33)
COLOR UR: YELLOW
CREAT SERPL-MCNC: 0.92 MG/DL (ref 0.5–1.4)
EOSINOPHIL # BLD AUTO: 0.18 K/UL (ref 0–0.51)
EOSINOPHIL NFR BLD: 2.2 % (ref 0–6.9)
ERYTHROCYTE [DISTWIDTH] IN BLOOD BY AUTOMATED COUNT: 46.5 FL (ref 35.9–50)
GLOBULIN SER CALC-MCNC: 3.1 G/DL (ref 1.9–3.5)
GLUCOSE SERPL-MCNC: 130 MG/DL (ref 65–99)
GLUCOSE UR STRIP.AUTO-MCNC: NEGATIVE MG/DL
HCT VFR BLD AUTO: 40.6 % (ref 42–52)
HGB BLD-MCNC: 13 G/DL (ref 14–18)
IMM GRANULOCYTES # BLD AUTO: 0.05 K/UL (ref 0–0.11)
IMM GRANULOCYTES NFR BLD AUTO: 0.6 % (ref 0–0.9)
KETONES UR STRIP.AUTO-MCNC: NEGATIVE MG/DL
LEUKOCYTE ESTERASE UR QL STRIP.AUTO: NEGATIVE
LIPASE SERPL-CCNC: 33 U/L (ref 7–58)
LYMPHOCYTES # BLD AUTO: 1.77 K/UL (ref 1–4.8)
LYMPHOCYTES NFR BLD: 21.8 % (ref 22–41)
MCH RBC QN AUTO: 30 PG (ref 27–33)
MCHC RBC AUTO-ENTMCNC: 32 G/DL (ref 33.7–35.3)
MCV RBC AUTO: 93.5 FL (ref 81.4–97.8)
MICRO URNS: NORMAL
MONOCYTES # BLD AUTO: 0.88 K/UL (ref 0–0.85)
MONOCYTES NFR BLD AUTO: 10.8 % (ref 0–13.4)
NEUTROPHILS # BLD AUTO: 5.19 K/UL (ref 1.82–7.42)
NEUTROPHILS NFR BLD: 64 % (ref 44–72)
NITRITE UR QL STRIP.AUTO: NEGATIVE
NRBC # BLD AUTO: 0 K/UL
NRBC BLD-RTO: 0 /100 WBC
PH UR STRIP.AUTO: 6 [PH]
PLATELET # BLD AUTO: 160 K/UL (ref 164–446)
PMV BLD AUTO: 11.1 FL (ref 9–12.9)
POTASSIUM SERPL-SCNC: 3.2 MMOL/L (ref 3.6–5.5)
PROT SERPL-MCNC: 6.7 G/DL (ref 6–8.2)
PROT UR QL STRIP: NEGATIVE MG/DL
RBC # BLD AUTO: 4.34 M/UL (ref 4.7–6.1)
RBC UR QL AUTO: NEGATIVE
SODIUM SERPL-SCNC: 138 MMOL/L (ref 135–145)
SP GR UR STRIP.AUTO: 1.01
TSH SERPL DL<=0.005 MIU/L-ACNC: 1.82 UIU/ML (ref 0.38–5.33)
WBC # BLD AUTO: 8.1 K/UL (ref 4.8–10.8)

## 2018-12-18 PROCEDURE — 303105 HCHG CATHETER EXTRA

## 2018-12-18 PROCEDURE — 83036 HEMOGLOBIN GLYCOSYLATED A1C: CPT

## 2018-12-18 PROCEDURE — 81003 URINALYSIS AUTO W/O SCOPE: CPT

## 2018-12-18 PROCEDURE — A9270 NON-COVERED ITEM OR SERVICE: HCPCS | Performed by: INTERNAL MEDICINE

## 2018-12-18 PROCEDURE — 99223 1ST HOSP IP/OBS HIGH 75: CPT | Mod: AI | Performed by: INTERNAL MEDICINE

## 2018-12-18 PROCEDURE — 96372 THER/PROPH/DIAG INJ SC/IM: CPT

## 2018-12-18 PROCEDURE — 700101 HCHG RX REV CODE 250: Performed by: INTERNAL MEDICINE

## 2018-12-18 PROCEDURE — 770006 HCHG ROOM/CARE - MED/SURG/GYN SEMI*

## 2018-12-18 PROCEDURE — 700111 HCHG RX REV CODE 636 W/ 250 OVERRIDE (IP): Performed by: INTERNAL MEDICINE

## 2018-12-18 PROCEDURE — 51702 INSERT TEMP BLADDER CATH: CPT

## 2018-12-18 PROCEDURE — 84443 ASSAY THYROID STIM HORMONE: CPT

## 2018-12-18 PROCEDURE — 80053 COMPREHEN METABOLIC PANEL: CPT

## 2018-12-18 PROCEDURE — 74177 CT ABD & PELVIS W/CONTRAST: CPT

## 2018-12-18 PROCEDURE — 700102 HCHG RX REV CODE 250 W/ 637 OVERRIDE(OP): Performed by: INTERNAL MEDICINE

## 2018-12-18 PROCEDURE — 36415 COLL VENOUS BLD VENIPUNCTURE: CPT

## 2018-12-18 PROCEDURE — 83690 ASSAY OF LIPASE: CPT

## 2018-12-18 PROCEDURE — 99285 EMERGENCY DEPT VISIT HI MDM: CPT

## 2018-12-18 PROCEDURE — 85025 COMPLETE CBC W/AUTO DIFF WBC: CPT

## 2018-12-18 PROCEDURE — 700117 HCHG RX CONTRAST REV CODE 255: Performed by: EMERGENCY MEDICINE

## 2018-12-18 RX ORDER — QUETIAPINE FUMARATE 25 MG/1
25 TABLET, FILM COATED ORAL
COMMUNITY

## 2018-12-18 RX ORDER — TRAMADOL HYDROCHLORIDE 50 MG/1
50 TABLET ORAL EVERY 6 HOURS PRN
Status: DISCONTINUED | OUTPATIENT
Start: 2018-12-18 | End: 2018-12-22 | Stop reason: HOSPADM

## 2018-12-18 RX ORDER — LANOLIN ALCOHOL/MO/W.PET/CERES
3 CREAM (GRAM) TOPICAL
COMMUNITY

## 2018-12-18 RX ORDER — DICYCLOMINE HCL 20 MG
20 TABLET ORAL EVERY 6 HOURS
Qty: 20 TAB | Refills: 0 | Status: SHIPPED | OUTPATIENT
Start: 2018-12-18 | End: 2019-01-29

## 2018-12-18 RX ORDER — ATORVASTATIN CALCIUM 10 MG/1
10 TABLET, FILM COATED ORAL DAILY
Status: DISCONTINUED | OUTPATIENT
Start: 2018-12-19 | End: 2018-12-22 | Stop reason: HOSPADM

## 2018-12-18 RX ORDER — QUETIAPINE FUMARATE 25 MG/1
25 TABLET, FILM COATED ORAL
Status: DISCONTINUED | OUTPATIENT
Start: 2018-12-18 | End: 2018-12-22 | Stop reason: HOSPADM

## 2018-12-18 RX ORDER — SODIUM CHLORIDE AND POTASSIUM CHLORIDE 300; 900 MG/100ML; MG/100ML
INJECTION, SOLUTION INTRAVENOUS CONTINUOUS
Status: DISCONTINUED | OUTPATIENT
Start: 2018-12-18 | End: 2018-12-22 | Stop reason: HOSPADM

## 2018-12-18 RX ORDER — OMEPRAZOLE 20 MG/1
20 CAPSULE, DELAYED RELEASE ORAL 2 TIMES DAILY
COMMUNITY

## 2018-12-18 RX ORDER — MIRTAZAPINE 7.5 MG/1
7.5 TABLET, FILM COATED ORAL NIGHTLY
COMMUNITY

## 2018-12-18 RX ORDER — MIRTAZAPINE 15 MG/1
7.5 TABLET, ORALLY DISINTEGRATING ORAL
Status: DISCONTINUED | OUTPATIENT
Start: 2018-12-18 | End: 2018-12-22 | Stop reason: HOSPADM

## 2018-12-18 RX ORDER — OMEPRAZOLE 20 MG/1
20 CAPSULE, DELAYED RELEASE ORAL 2 TIMES DAILY
Status: DISCONTINUED | OUTPATIENT
Start: 2018-12-18 | End: 2018-12-22 | Stop reason: HOSPADM

## 2018-12-18 RX ORDER — TAMSULOSIN HYDROCHLORIDE 0.4 MG/1
0.4 CAPSULE ORAL DAILY
Qty: 20 CAP | Refills: 0 | Status: SHIPPED | OUTPATIENT
Start: 2018-12-18 | End: 2018-12-22

## 2018-12-18 RX ORDER — AMOXICILLIN 250 MG
2 CAPSULE ORAL 2 TIMES DAILY
Status: DISCONTINUED | OUTPATIENT
Start: 2018-12-18 | End: 2018-12-22 | Stop reason: HOSPADM

## 2018-12-18 RX ORDER — PREDNISONE 10 MG/1
10 TABLET ORAL DAILY
Status: ON HOLD | COMMUNITY
Start: 2018-12-11 | End: 2018-12-22

## 2018-12-18 RX ORDER — AMOXICILLIN AND CLAVULANATE POTASSIUM 875; 125 MG/1; MG/1
1 TABLET, FILM COATED ORAL 2 TIMES DAILY
Status: ON HOLD | COMMUNITY
Start: 2018-12-11 | End: 2018-12-22

## 2018-12-18 RX ORDER — BISACODYL 10 MG
10 SUPPOSITORY, RECTAL RECTAL
Status: DISCONTINUED | OUTPATIENT
Start: 2018-12-18 | End: 2018-12-22 | Stop reason: HOSPADM

## 2018-12-18 RX ORDER — METOPROLOL TARTRATE 50 MG/1
50 TABLET, FILM COATED ORAL 2 TIMES DAILY
Status: DISCONTINUED | OUTPATIENT
Start: 2018-12-18 | End: 2018-12-22 | Stop reason: HOSPADM

## 2018-12-18 RX ORDER — ONDANSETRON 4 MG/1
4 TABLET, ORALLY DISINTEGRATING ORAL EVERY 4 HOURS PRN
Status: DISCONTINUED | OUTPATIENT
Start: 2018-12-18 | End: 2018-12-22 | Stop reason: HOSPADM

## 2018-12-18 RX ORDER — TRAMADOL HYDROCHLORIDE 50 MG/1
50 TABLET ORAL EVERY 6 HOURS PRN
COMMUNITY
End: 2018-12-22

## 2018-12-18 RX ORDER — ONDANSETRON 2 MG/ML
4 INJECTION INTRAMUSCULAR; INTRAVENOUS EVERY 4 HOURS PRN
Status: DISCONTINUED | OUTPATIENT
Start: 2018-12-18 | End: 2018-12-22 | Stop reason: HOSPADM

## 2018-12-18 RX ORDER — POLYETHYLENE GLYCOL 3350 17 G/17G
1 POWDER, FOR SOLUTION ORAL
Status: DISCONTINUED | OUTPATIENT
Start: 2018-12-18 | End: 2018-12-22 | Stop reason: HOSPADM

## 2018-12-18 RX ADMIN — MIRTAZAPINE 7.5 MG: 15 TABLET, ORALLY DISINTEGRATING ORAL at 22:03

## 2018-12-18 RX ADMIN — METFORMIN HYDROCHLORIDE 500 MG: 500 TABLET, FILM COATED ORAL at 22:17

## 2018-12-18 RX ADMIN — QUETIAPINE FUMARATE 25 MG: 25 TABLET ORAL at 22:03

## 2018-12-18 RX ADMIN — STANDARDIZED SENNA CONCENTRATE AND DOCUSATE SODIUM 2 TABLET: 8.6; 5 TABLET, FILM COATED ORAL at 22:03

## 2018-12-18 RX ADMIN — IOHEXOL 100 ML: 350 INJECTION, SOLUTION INTRAVENOUS at 15:40

## 2018-12-18 RX ADMIN — METOPROLOL TARTRATE 50 MG: 50 TABLET ORAL at 22:03

## 2018-12-18 RX ADMIN — ENOXAPARIN SODIUM 80 MG: 100 INJECTION SUBCUTANEOUS at 22:03

## 2018-12-18 RX ADMIN — OMEPRAZOLE 20 MG: 20 CAPSULE, DELAYED RELEASE ORAL at 22:03

## 2018-12-18 RX ADMIN — POTASSIUM CHLORIDE AND SODIUM CHLORIDE: 900; 300 INJECTION, SOLUTION INTRAVENOUS at 21:13

## 2018-12-18 ASSESSMENT — COPD QUESTIONNAIRES
DURING THE PAST 4 WEEKS HOW MUCH DID YOU FEEL SHORT OF BREATH: NONE/LITTLE OF THE TIME
DO YOU EVER COUGH UP ANY MUCUS OR PHLEGM?: NO/ONLY WITH OCCASIONAL COLDS OR INFECTIONS
IN THE PAST 12 MONTHS DO YOU DO LESS THAN YOU USED TO BECAUSE OF YOUR BREATHING PROBLEMS: DISAGREE/UNSURE
COPD SCREENING SCORE: 2
HAVE YOU SMOKED AT LEAST 100 CIGARETTES IN YOUR ENTIRE LIFE: NO/DON'T KNOW

## 2018-12-18 ASSESSMENT — PATIENT HEALTH QUESTIONNAIRE - PHQ9
1. LITTLE INTEREST OR PLEASURE IN DOING THINGS: NOT AT ALL
SUM OF ALL RESPONSES TO PHQ9 QUESTIONS 1 AND 2: 0
2. FEELING DOWN, DEPRESSED, IRRITABLE, OR HOPELESS: NOT AT ALL

## 2018-12-18 ASSESSMENT — COGNITIVE AND FUNCTIONAL STATUS - GENERAL
MOBILITY SCORE: 11
HELP NEEDED FOR BATHING: A LITTLE
PERSONAL GROOMING: A LITTLE
STANDING UP FROM CHAIR USING ARMS: A LOT
DRESSING REGULAR UPPER BODY CLOTHING: A LITTLE
CLIMB 3 TO 5 STEPS WITH RAILING: TOTAL
MOVING FROM LYING ON BACK TO SITTING ON SIDE OF FLAT BED: A LOT
MOVING TO AND FROM BED TO CHAIR: A LOT
WALKING IN HOSPITAL ROOM: TOTAL
DAILY ACTIVITIY SCORE: 20
TURNING FROM BACK TO SIDE WHILE IN FLAT BAD: A LITTLE
SUGGESTED CMS G CODE MODIFIER MOBILITY: CL
TOILETING: A LITTLE
SUGGESTED CMS G CODE MODIFIER DAILY ACTIVITY: CJ

## 2018-12-18 ASSESSMENT — LIFESTYLE VARIABLES: ALCOHOL_USE: NO

## 2018-12-18 ASSESSMENT — PAIN SCALES - GENERAL
PAINLEVEL_OUTOF10: 0

## 2018-12-18 NOTE — ED TRIAGE NOTES
Pt BIB [facility] with c/o constipation x1 week and abdominal distention. Pt abdomen is rigid on palpation. Pt denies any pain.

## 2018-12-18 NOTE — DISCHARGE PLANNING
Anticipated Discharge Disposition: TBD    Action: LSW received t/c from pt's daughter, Valery, [phone], requesting information about pt's insurance coverage due [facility]. LSW requested pt's daughter call the [facility] to request this specific information. Pt's daughter reports to believing that pt needs to be on hospice due to pt not eating or drinking on pt's own. Pt's daughter reports that [facility] denied pt but pt's daughter would like to have pt revaluated. LSW stated that once pt is moved to a floor, pt will be assigned an MD, and MD can request a palliative consult or recommend hospice. Valery reports that once pt is hospice appropriate, [facility]. LSW provided pt's daughter with this LSW's number and RN Case Manager's number depending on which floor pt is moved to once out of the ER.     Pt's daughter lives in the [place] Area and is contemplating coming up to visit pt but has two children and does not know the best time to do so.      Barriers to Discharge: None    Plan: TBD

## 2018-12-18 NOTE — ED PROVIDER NOTES
ED Provider Note    CHIEF COMPLAINT  Chief Complaint   Patient presents with   • Constipation   • Abdominal Swelling       HPI  Randall Odell is a 78 y.o. male who presents With a history of dementia, he is unable to give me a history, the patient's daughter who is the power of  called to tell me that he has a history of dementia type 2 diabetes A. fib he is on antibiotics currently for phlebitis, he has had abdominal distention and no bowel movement for 1 week.  The patient denies any symptoms but the daughter has warned me that at baseline he does not complain of any pain.  The patient is DNR DNI.  Further HPI cannot be obtained from the patient secondary to his baseline dementia.    REVIEW OF SYSTEMS  Review of systems cannot be obtained second the patient's baseline dementia.    PAST MEDICAL HISTORY   has a past medical history of A-fib (HCC); Acid reflux disease; Diabetes (HCC); Normal pressure hydrocephalus; and Urinary incontinence.    SOCIAL HISTORY  Social History     Social History Main Topics   • Smoking status: Never Smoker   • Smokeless tobacco: Never Used   • Alcohol use No      Comment: Pt states he has quit drinking   • Drug use: No   • Sexual activity: Not on file       SURGICAL HISTORY   has a past surgical history that includes other orthopedic surgery.    CURRENT MEDICATIONS  Home Medications     Reviewed by Verona Rascon (Pharmacy Tech) on 12/18/18 at 1419  Med List Status: Complete   Medication Last Dose Status   amoxicillin-clavulanate (AUGMENTIN) 875-125 MG Tab 12/18/2018 Active   atorvastatin (LIPITOR) 10 MG Tab 12/17/2018 Active   Calcium Carbonate-Vitamin D (CALCIUM 600 + D) 600-400 MG-UNIT Tab 12/18/2018 Active   melatonin 3 MG Tab 12/17/2018 Active   metFORMIN (GLUCOPHAGE) 500 MG Tab 12/18/2018 Active   metoprolol (LOPRESSOR) 50 MG Tab 12/18/2018 Active   mirtazapine (REMERON) 7.5 MG tablet 12/17/2018 Active   omeprazole (PRILOSEC) 20 MG delayed-release capsule  12/18/2018 Active   predniSONE (DELTASONE) 10 MG Tab 12/14/2018 Active   QUEtiapine (SEROQUEL) 25 MG Tab 12/17/2018 Active   tramadol (ULTRAM) 50 MG Tab 12/16/2018 Active                  ALLERGIES  No Known Allergies    PHYSICAL EXAM  VITAL SIGNS: /74   Pulse 65   Temp 36.5 °C (97.7 °F) (Temporal)   Resp 18   Wt 93.5 kg (206 lb 2.1 oz)   SpO2 94%   BMI 31.34 kg/m²  @JUDI[[id number]::@   Pulse ox interpretation: I interpret this pulse ox as normal.  Constitutional: Alert.  HENT: No signs of trauma, Bilateral external ears normal, Nose normal.   Eyes: Pupils are equal and reactive, Conjunctiva normal, Non-icteric.   Neck: Normal range of motion, No tenderness, Supple, No stridor.   Lymphatic: No lymphadenopathy noted.   Cardiovascular: Regular rate and rhythm, no murmurs.   Thorax & Lungs: Normal breath sounds, No respiratory distress, No wheezing, No chest tenderness.   Abdomen: Decreased bowel sounds, abdominal distention, no tenderness.  Skin: Warm, Dry, No erythema, No rash.   Extremities: Intact distal pulses, No edema, No tenderness, No cyanosis.  Musculoskeletal: Good range of motion in all major joints. No tenderness to palpation or major deformities noted.   Neurologic: Alert , Normal motor function, Normal sensory function, No focal deficits noted.   Psychiatric: Flat affect.      DIAGNOSTIC STUDIES / PROCEDURES        LABS  Labs Reviewed   CBC WITH DIFFERENTIAL - Abnormal; Notable for the following:        Result Value    RBC 4.34 (*)     Hemoglobin 13.0 (*)     Hematocrit 40.6 (*)     MCHC 32.0 (*)     Platelet Count 160 (*)     Lymphocytes 21.80 (*)     Monos (Absolute) 0.88 (*)     All other components within normal limits   COMP METABOLIC PANEL - Abnormal; Notable for the following:     Potassium 3.2 (*)     Glucose 130 (*)     Alkaline Phosphatase 102 (*)     All other components within normal limits   LIPASE   ESTIMATED GFR   URINALYSIS,CULTURE IF INDICATED         RADIOLOGY  CT-ABDOMEN-PELVIS  WITH   Final Result         1. Significantly distended urinary bladder likely secondary to bladder outlet obstruction.      2. Moderate bilateral hydronephrosis, likely secondary to back pressure from distended urine bladder.      3. Mild infrarenal abdominal aortic aneurysm. Ascending aortic aneurysm, measuring 4.2 cm.      4. Questionable poor enhancement of the bilateral iliac and inguinal veins could be due to timing of contrast. Correlate with ultrasound.      5. Peripheral opacity opacities in the lung bases could be seen with interstitial lung disease.              COURSE & MEDICAL DECISION MAKING  Pertinent Labs & Imaging studies reviewed. (See chart for details)    Differential diagnosis: Bowel obstruction, constipation, dehydration, electrolyte abnormality    The patient's exam is concerning for bowel obstruction.  CT scan was ordered.    The patient's CT demonstrates urinary retention.  Cabello catheter was placed, he voided 2600 cc of urine.  The urine is negative for infection.     The patient is currently in assisted living, I spoke with the daughter who lives in the [place] area who reports that she is try to get the patient placed into skilled nursing.  At this point the patient will need to be admitted because they will not be able to take care of the patient at assisted living.    I spoke with Dr. Lorean Gonzales who will admit the patient.      FINAL IMPRESSION  1. Acute urinary tract infection  2.  Dementia  3.         Electronically signed by: Agustín Murguia, 12/18/2018 1:19 PM

## 2018-12-18 NOTE — ED NOTES
Med rec updated and complete  Allergies reviewed  Pt is not sure what medications he is taking.  Received MAR from [employer]

## 2018-12-18 NOTE — ED NOTES
Pt assessed. No complaints at this time. Caregiver at BS states the patient has not had BM since Friday. Denies N/V. Abdomen distended, bowel sounds present. Denies pain. Will cont to monitor

## 2018-12-19 LAB
ALBUMIN SERPL BCP-MCNC: 2.8 G/DL (ref 3.2–4.9)
ALBUMIN/GLOB SERPL: 1 G/DL
ALP SERPL-CCNC: 82 U/L (ref 30–99)
ALT SERPL-CCNC: 18 U/L (ref 2–50)
ANION GAP SERPL CALC-SCNC: 7 MMOL/L (ref 0–11.9)
AST SERPL-CCNC: 17 U/L (ref 12–45)
BASOPHILS # BLD AUTO: 0.5 % (ref 0–1.8)
BASOPHILS # BLD: 0.04 K/UL (ref 0–0.12)
BILIRUB SERPL-MCNC: 0.9 MG/DL (ref 0.1–1.5)
BUN SERPL-MCNC: 14 MG/DL (ref 8–22)
CALCIUM SERPL-MCNC: 8.5 MG/DL (ref 8.4–10.2)
CHLORIDE SERPL-SCNC: 106 MMOL/L (ref 96–112)
CO2 SERPL-SCNC: 27 MMOL/L (ref 20–33)
CREAT SERPL-MCNC: 0.87 MG/DL (ref 0.5–1.4)
EOSINOPHIL # BLD AUTO: 0.23 K/UL (ref 0–0.51)
EOSINOPHIL NFR BLD: 2.9 % (ref 0–6.9)
ERYTHROCYTE [DISTWIDTH] IN BLOOD BY AUTOMATED COUNT: 46.8 FL (ref 35.9–50)
GLOBULIN SER CALC-MCNC: 2.8 G/DL (ref 1.9–3.5)
GLUCOSE BLD-MCNC: 106 MG/DL (ref 65–99)
GLUCOSE SERPL-MCNC: 91 MG/DL (ref 65–99)
HCT VFR BLD AUTO: 37.5 % (ref 42–52)
HGB BLD-MCNC: 12.1 G/DL (ref 14–18)
IMM GRANULOCYTES # BLD AUTO: 0.04 K/UL (ref 0–0.11)
IMM GRANULOCYTES NFR BLD AUTO: 0.5 % (ref 0–0.9)
LYMPHOCYTES # BLD AUTO: 1.94 K/UL (ref 1–4.8)
LYMPHOCYTES NFR BLD: 24.7 % (ref 22–41)
MCH RBC QN AUTO: 30 PG (ref 27–33)
MCHC RBC AUTO-ENTMCNC: 32.3 G/DL (ref 33.7–35.3)
MCV RBC AUTO: 92.8 FL (ref 81.4–97.8)
MONOCYTES # BLD AUTO: 0.82 K/UL (ref 0–0.85)
MONOCYTES NFR BLD AUTO: 10.4 % (ref 0–13.4)
NEUTROPHILS # BLD AUTO: 4.78 K/UL (ref 1.82–7.42)
NEUTROPHILS NFR BLD: 61 % (ref 44–72)
NRBC # BLD AUTO: 0 K/UL
NRBC BLD-RTO: 0 /100 WBC
PLATELET # BLD AUTO: 154 K/UL (ref 164–446)
PMV BLD AUTO: 11.3 FL (ref 9–12.9)
POTASSIUM SERPL-SCNC: 3 MMOL/L (ref 3.6–5.5)
PROT SERPL-MCNC: 5.6 G/DL (ref 6–8.2)
RBC # BLD AUTO: 4.04 M/UL (ref 4.7–6.1)
SODIUM SERPL-SCNC: 140 MMOL/L (ref 135–145)
WBC # BLD AUTO: 7.9 K/UL (ref 4.8–10.8)

## 2018-12-19 PROCEDURE — 90662 IIV NO PRSV INCREASED AG IM: CPT | Performed by: INTERNAL MEDICINE

## 2018-12-19 PROCEDURE — 770020 HCHG ROOM/CARE - TELE (206)

## 2018-12-19 PROCEDURE — 3E02340 INTRODUCTION OF INFLUENZA VACCINE INTO MUSCLE, PERCUTANEOUS APPROACH: ICD-10-PCS | Performed by: INTERNAL MEDICINE

## 2018-12-19 PROCEDURE — A9270 NON-COVERED ITEM OR SERVICE: HCPCS | Performed by: INTERNAL MEDICINE

## 2018-12-19 PROCEDURE — 700111 HCHG RX REV CODE 636 W/ 250 OVERRIDE (IP): Performed by: INTERNAL MEDICINE

## 2018-12-19 PROCEDURE — 80053 COMPREHEN METABOLIC PANEL: CPT

## 2018-12-19 PROCEDURE — 85025 COMPLETE CBC W/AUTO DIFF WBC: CPT

## 2018-12-19 PROCEDURE — 90471 IMMUNIZATION ADMIN: CPT

## 2018-12-19 PROCEDURE — 700101 HCHG RX REV CODE 250: Performed by: INTERNAL MEDICINE

## 2018-12-19 PROCEDURE — 99233 SBSQ HOSP IP/OBS HIGH 50: CPT | Performed by: HOSPITALIST

## 2018-12-19 PROCEDURE — 700102 HCHG RX REV CODE 250 W/ 637 OVERRIDE(OP): Performed by: INTERNAL MEDICINE

## 2018-12-19 PROCEDURE — 82962 GLUCOSE BLOOD TEST: CPT

## 2018-12-19 RX ADMIN — METOPROLOL TARTRATE 50 MG: 50 TABLET ORAL at 05:31

## 2018-12-19 RX ADMIN — STANDARDIZED SENNA CONCENTRATE AND DOCUSATE SODIUM 2 TABLET: 8.6; 5 TABLET, FILM COATED ORAL at 05:31

## 2018-12-19 RX ADMIN — ENOXAPARIN SODIUM 80 MG: 100 INJECTION SUBCUTANEOUS at 18:12

## 2018-12-19 RX ADMIN — METFORMIN HYDROCHLORIDE 500 MG: 500 TABLET, FILM COATED ORAL at 09:00

## 2018-12-19 RX ADMIN — METFORMIN HYDROCHLORIDE 500 MG: 500 TABLET, FILM COATED ORAL at 18:12

## 2018-12-19 RX ADMIN — METOPROLOL TARTRATE 50 MG: 50 TABLET ORAL at 18:12

## 2018-12-19 RX ADMIN — ATORVASTATIN CALCIUM 10 MG: 10 TABLET, FILM COATED ORAL at 05:31

## 2018-12-19 RX ADMIN — OMEPRAZOLE 20 MG: 20 CAPSULE, DELAYED RELEASE ORAL at 18:12

## 2018-12-19 RX ADMIN — OMEPRAZOLE 20 MG: 20 CAPSULE, DELAYED RELEASE ORAL at 05:31

## 2018-12-19 RX ADMIN — ENOXAPARIN SODIUM 80 MG: 100 INJECTION SUBCUTANEOUS at 05:27

## 2018-12-19 RX ADMIN — MIRTAZAPINE 7.5 MG: 15 TABLET, ORALLY DISINTEGRATING ORAL at 21:51

## 2018-12-19 RX ADMIN — QUETIAPINE FUMARATE 25 MG: 25 TABLET ORAL at 21:51

## 2018-12-19 RX ADMIN — POTASSIUM CHLORIDE AND SODIUM CHLORIDE: 900; 300 INJECTION, SOLUTION INTRAVENOUS at 16:20

## 2018-12-19 RX ADMIN — INFLUENZA A VIRUS A/MICHIGAN/45/2015 X-275 (H1N1) ANTIGEN (FORMALDEHYDE INACTIVATED), INFLUENZA A VIRUS A/SINGAPORE/INFIMH-16-0019/2016 IVR-186 (H3N2) ANTIGEN (FORMALDEHYDE INACTIVATED), AND INFLUENZA B VIRUS B/MARYLAND/15/2016 BX-69A (A B/COLORADO/6/2017-LIKE VIRUS) ANTIGEN (FORMALDEHYDE INACTIVATED) 0.5 ML: 60; 60; 60 INJECTION, SUSPENSION INTRAMUSCULAR at 05:29

## 2018-12-19 ASSESSMENT — PAIN SCALES - GENERAL
PAINLEVEL_OUTOF10: 0

## 2018-12-19 NOTE — ASSESSMENT & PLAN NOTE
Currently lives in assisted living facility, has family in [place].  He will DC to SNF when bed available

## 2018-12-19 NOTE — CARE PLAN
Problem: Communication  Goal: The ability to communicate needs accurately and effectively will improve  Outcome: PROGRESSING AS EXPECTED  Frequent checks on patients' needs. Responds appropriately    Problem: Safety  Goal: Will remain free from injury  Outcome: PROGRESSING AS EXPECTED  Unable to mobilize. Call light within reach. Bed alarm is on  Goal: Will remain free from falls  Outcome: PROGRESSING AS EXPECTED      Problem: Skin Integrity  Goal: Risk for impaired skin integrity will decrease  Outcome: PROGRESSING AS EXPECTED  Q2 turns

## 2018-12-19 NOTE — PROGRESS NOTES
2 RN skin check completed. Skin to bilateral calves pink, discolored, and flaky. Two scab sites noted to left arm--both dry. Small, dry scratch noted to right hip. Scattered scabbing noted to back. Sacrum and bilateral buttocks red and blanching. Otherwise, no open wounds or lesions.

## 2018-12-19 NOTE — DISCHARGE PLANNING
Anticipated Discharge Disposition: undetermined at this time    Action: SW met with this patient to complete assessment.  Patient currently lives at [facility] however it appears they are not able to provided the assistance that this patient needs at this time. Patient reports that he uses a FWW and gets support from the [facility] staff. Patient has both a POLST and POA scanned into his chart naming his DTR, Valery as POA. Patient stated that he does not drive anymore, but that [facility] provides his transportation.     Plan: NATASHA believes that patient is going to be admitted.

## 2018-12-19 NOTE — DISCHARGE PLANNING
This CM called daughter who is listed as patient's POA and arranged for a phone conference today at 1500 when she is off work to discuss discharge plan, no call from daughter at this time I will call her again tomorrow.

## 2018-12-19 NOTE — ASSESSMENT & PLAN NOTE
Rate controlled  Continue metoprolol BP ranging from 90s to 110s, HR is controlled continue current dose  Most likely not a good candidate for long-term anticoagulation for atrial fibrillation however for his bilateral lower extremity edema will at least require 3 months.

## 2018-12-19 NOTE — ASSESSMENT & PLAN NOTE
Distended on admission, s/p erwin insertion with 2600cc drainage.  Resume Flomax.  patient will need outpatient urology follow-up with at least one week Erwin catheter placement for bladder decompression  He will discharge with Erwin catheter

## 2018-12-19 NOTE — CARE PLAN
Problem: Safety  Goal: Will remain free from injury  Outcome: PROGRESSING AS EXPECTED  No falls or injuries at this time.     Problem: Infection  Goal: Will remain free from infection  Outcome: PROGRESSING AS EXPECTED  No new signs or symptoms of infection at this time.

## 2018-12-19 NOTE — ED NOTES
Pt states no pain or feeling like he needs to urinate. Urinary incontinence, continuous dribbling. Cabello catheter placed and >2000ml output of clear dark yellow urine output and still flowing.

## 2018-12-19 NOTE — H&P
Hospital Medicine History & Physical Note    Date of Service  12/18/2018    Primary Care Physician  JEREMIAH Holland    Consultants  None    Code Status  Full (unable to confirm with patient due to dementia)    Chief Complaint  Abdominal Distention    History of Presenting Illness  78 y.o. male with a hx of dementia, currently living in assisted living who presented 12/18/2018 with abdominal distention.  The patient is a very poor historian due to stable dementia.  He is unable to give any details about why he arrived here in the hospital.  His assisted living history was given to the emergency department physician however this is also somewhat limited.  He has been having increased abdominal distention for the last several days.  He has had some mild constipation however his urinary habits have not been noted.  He denies any fever or cough.    Upon his arrival to the emergency department he was noted to have a distended bladder.  A Cabello catheter was placed and 2600 mL's were drained.  This relieved his distention.  Vital signs were stable.  Conversation was held with his daughter who lives in [place] and she expressed desire to have him moved into a skilled nursing facility.  We will order physical therapy and begin this evaluation.    Review of Systems  Review of Systems   Unable to perform ROS: Dementia     Past medical history, surgical history, family history, social history were obtained from chart review, patient is a poor historian and unable to recall    Past Medical History   has a past medical history of A-fib (HCC); Acid reflux disease; Diabetes (HCC); Normal pressure hydrocephalus; and Urinary incontinence.    Surgical History   has a past surgical history that includes other orthopedic surgery.     Family History  family history includes Heart Disease in his father; No Known Problems in his mother.     Social History   reports that he has never smoked. He has never used smokeless tobacco.  He reports that he does not drink alcohol or use drugs.    Allergies  No Known Allergies    Medications  Prior to Admission Medications   Prescriptions Last Dose Informant Patient Reported? Taking?   Calcium Carbonate-Vitamin D (CALCIUM 600 + D) 600-400 MG-UNIT Tab 12/18/2018 at 0800 MAR from Other Facility Yes No   Sig: Take 1 Tab by mouth every day.   QUEtiapine (SEROQUEL) 25 MG Tab 12/17/2018 at 2000 MAR from Other Facility Yes Yes   Sig: Take 25 mg by mouth every bedtime.   amoxicillin-clavulanate (AUGMENTIN) 875-125 MG Tab 12/18/2018 at 0800 MAR from Other Facility Yes Yes   Sig: Take 1 Tab by mouth 2 times a day. Pt started on 12/11/2018 for 7 day course.   atorvastatin (LIPITOR) 10 MG Tab 12/17/2018 at 1700 MAR from Other Facility Yes No   Sig: Take 10 mg by mouth every day.   melatonin 3 MG Tab 12/17/2018 at 2000 MAR from Other Facility Yes Yes   Sig: Take 3 mg by mouth every bedtime.   metFORMIN (GLUCOPHAGE) 500 MG Tab 12/18/2018 at 0900 MAR from Other Facility Yes No   Sig: Take 500 mg by mouth 2 times a day, with meals.   metoprolol (LOPRESSOR) 50 MG Tab 12/18/2018 at 0900 MAR from Other Facility No No   Sig: Take 1 Tab by mouth 2 times a day.   mirtazapine (REMERON) 7.5 MG tablet 12/17/2018 at 2000 MAR from Other Facility Yes Yes   Sig: Take 7.5 mg by mouth every evening.   omeprazole (PRILOSEC) 20 MG delayed-release capsule 12/18/2018 at 0800 MAR from Other Facility Yes Yes   Sig: Take 20 mg by mouth 2 times a day.   predniSONE (DELTASONE) 10 MG Tab 12/14/2018 at Finished MAR from Other Facility Yes Yes   Sig: Take 10 mg by mouth every day. Pt started on 12/11/2018 for 4 day course.   tramadol (ULTRAM) 50 MG Tab 12/16/2018 at Unknown MAR from Other Facility Yes Yes   Sig: Take 50 mg by mouth every 6 hours as needed for Moderate Pain.      Facility-Administered Medications: None       Physical Exam  Temp:  [36.5 °C (97.7 °F)-36.9 °C (98.5 °F)] 36.9 °C (98.5 °F)  Pulse:  [59-73] 65  Resp:  [16-18] 16  BP:  (132-143)/(74-76) 143/76    Physical Exam   Constitutional: He is oriented to person, place, and time. He appears well-developed and well-nourished. No distress.   HENT:   Head: Normocephalic.   Mouth/Throat: No oropharyngeal exudate.   Eyes: Pupils are equal, round, and reactive to light. Conjunctivae are normal.   Neck: Normal range of motion. No tracheal deviation present.   Cardiovascular: Normal rate and regular rhythm.    No murmur heard.  Pulmonary/Chest: Effort normal. No respiratory distress. He has no wheezes. He exhibits no tenderness.   Abdominal: Soft. He exhibits no distension. There is no tenderness. There is no guarding.   Genitourinary:   Genitourinary Comments: Cabello catheter in place   Musculoskeletal: Normal range of motion. He exhibits no edema or tenderness.   Lymphadenopathy:     He has no cervical adenopathy.   Neurological: He is alert and oriented to person, place, and time. No cranial nerve deficit.   Skin: Skin is warm and dry. No rash noted. There is pallor.   Nursing note and vitals reviewed.      Laboratory:  Recent Labs      12/18/18   1335   WBC  8.1   RBC  4.34*   HEMOGLOBIN  13.0*   HEMATOCRIT  40.6*   MCV  93.5   MCH  30.0   MCHC  32.0*   RDW  46.5   PLATELETCT  160*   MPV  11.1     Recent Labs      12/18/18   1335   SODIUM  138   POTASSIUM  3.2*   CHLORIDE  105   CO2  25   GLUCOSE  130*   BUN  20   CREATININE  0.92   CALCIUM  8.5     Recent Labs      12/18/18   1335   ALTSGPT  21   ASTSGOT  22   ALKPHOSPHAT  102*   TBILIRUBIN  0.9   LIPASE  33   GLUCOSE  130*                 No results for input(s): TROPONINI in the last 72 hours.    Urinalysis:    Recent Labs      12/18/18   1626   SPECGRAVITY  1.010   GLUCOSEUR  Negative   KETONES  Negative   NITRITE  Negative   LEUKESTERAS  Negative        Imaging:  US-EXTREMITY VENOUS LOWER BILAT   Final Result      Extensive bilateral lower extremity deep vein thrombosis.      Thrombosis within the greater saphenous veins bilaterally.       Currently awaiting callback from Lorena Gonzales.      CT-ABDOMEN-PELVIS WITH   Final Result         1. Significantly distended urinary bladder likely secondary to bladder outlet obstruction.      2. Moderate bilateral hydronephrosis, likely secondary to back pressure from distended urine bladder.      3. Mild infrarenal abdominal aortic aneurysm. Ascending aortic aneurysm, measuring 4.2 cm.      4. Questionable poor enhancement of the bilateral iliac and inguinal veins could be due to timing of contrast. Correlate with ultrasound.      5. Peripheral opacity opacities in the lung bases could be seen with interstitial lung disease.            Assessment/Plan:  I anticipate this patient is appropriate for observation status at this time.    * Urinary retention   Assessment & Plan    May be medication side effect versus due to BPH.  His kidney function is normal.  -Cabello placed in the emergency department, continue  -Consider Flomax  - urology referral outpatient     Generalized weakness- (present on admission)   Assessment & Plan    Currently lives in assisted living facility, family is in [place].  He has been not doing well overall, he is confused at baseline, and will need a higher level of care  -SNF referral placed  -ERP discussed with daughter in [place]     Hypokalemia- (present on admission)   Assessment & Plan    Likely due to reduced p.o. intake, replace in IV fluids, repeat in the morning     Deep vein thrombosis (DVT) ([facility])   Assessment & Plan    Filling defect noted on CT scan so LE US ordered showing bilateral occlusive DVT  -Lovenox started  -Re-address goals of care with daughter in AM     Thrombocytopenia ([facility])   Assessment & Plan    No evidence of bleeding, monitor     Dementia   Assessment & Plan    Poor historian, he has been having increasing needs at the assisted living facility, needs higher level of care  -Continue Seroquel  -Continue Remeron 7.5     Cardiac pacemaker in situ- (present  on admission)   Assessment & Plan    Paced rhythm on EKG     Type 2 diabetes mellitus without complication, without long-term current use of insulin (HCC)- (present on admission)   Assessment & Plan    Continue metformin  Last A1c was 3 years ago, repeat today     Permanent atrial fibrillation (HCC)- (present on admission)   Assessment & Plan    Rate is currently controlled, continue metoprolol.  He is not a good anticoagulant candidate         VTE prophylaxis: Lovenox

## 2018-12-20 PROBLEM — D64.9 NORMOCYTIC ANEMIA: Status: ACTIVE | Noted: 2018-12-20

## 2018-12-20 PROBLEM — E83.42 HYPOMAGNESEMIA: Status: ACTIVE | Noted: 2018-12-20

## 2018-12-20 LAB
ALBUMIN SERPL BCP-MCNC: 2.6 G/DL (ref 3.2–4.9)
ALBUMIN/GLOB SERPL: 1 G/DL
ALP SERPL-CCNC: 81 U/L (ref 30–99)
ALT SERPL-CCNC: 14 U/L (ref 2–50)
ANION GAP SERPL CALC-SCNC: 5 MMOL/L (ref 0–11.9)
AST SERPL-CCNC: 16 U/L (ref 12–45)
BASOPHILS # BLD AUTO: 0.4 % (ref 0–1.8)
BASOPHILS # BLD: 0.03 K/UL (ref 0–0.12)
BILIRUB SERPL-MCNC: 1.1 MG/DL (ref 0.1–1.5)
BUN SERPL-MCNC: 9 MG/DL (ref 8–22)
CALCIUM SERPL-MCNC: 8 MG/DL (ref 8.4–10.2)
CHLORIDE SERPL-SCNC: 107 MMOL/L (ref 96–112)
CO2 SERPL-SCNC: 26 MMOL/L (ref 20–33)
CREAT SERPL-MCNC: 0.79 MG/DL (ref 0.5–1.4)
EOSINOPHIL # BLD AUTO: 0.21 K/UL (ref 0–0.51)
EOSINOPHIL NFR BLD: 2.8 % (ref 0–6.9)
ERYTHROCYTE [DISTWIDTH] IN BLOOD BY AUTOMATED COUNT: 46.5 FL (ref 35.9–50)
FERRITIN SERPL-MCNC: 734.6 NG/ML (ref 22–322)
GLOBULIN SER CALC-MCNC: 2.7 G/DL (ref 1.9–3.5)
GLUCOSE SERPL-MCNC: 89 MG/DL (ref 65–99)
HCT VFR BLD AUTO: 35.4 % (ref 42–52)
HGB BLD-MCNC: 11.4 G/DL (ref 14–18)
IMM GRANULOCYTES # BLD AUTO: 0.03 K/UL (ref 0–0.11)
IMM GRANULOCYTES NFR BLD AUTO: 0.4 % (ref 0–0.9)
IRON SATN MFR SERPL: 13 % (ref 15–55)
IRON SERPL-MCNC: 28 UG/DL (ref 50–180)
LYMPHOCYTES # BLD AUTO: 2.13 K/UL (ref 1–4.8)
LYMPHOCYTES NFR BLD: 28.9 % (ref 22–41)
MAGNESIUM SERPL-MCNC: 1.3 MG/DL (ref 1.5–2.5)
MCH RBC QN AUTO: 29.8 PG (ref 27–33)
MCHC RBC AUTO-ENTMCNC: 32.2 G/DL (ref 33.7–35.3)
MCV RBC AUTO: 92.4 FL (ref 81.4–97.8)
MONOCYTES # BLD AUTO: 0.85 K/UL (ref 0–0.85)
MONOCYTES NFR BLD AUTO: 11.5 % (ref 0–13.4)
NEUTROPHILS # BLD AUTO: 4.13 K/UL (ref 1.82–7.42)
NEUTROPHILS NFR BLD: 56 % (ref 44–72)
NRBC # BLD AUTO: 0 K/UL
NRBC BLD-RTO: 0 /100 WBC
PLATELET # BLD AUTO: 156 K/UL (ref 164–446)
PMV BLD AUTO: 10.8 FL (ref 9–12.9)
POTASSIUM SERPL-SCNC: 3.3 MMOL/L (ref 3.6–5.5)
PROT SERPL-MCNC: 5.3 G/DL (ref 6–8.2)
RBC # BLD AUTO: 3.83 M/UL (ref 4.7–6.1)
SODIUM SERPL-SCNC: 138 MMOL/L (ref 135–145)
TIBC SERPL-MCNC: 210 UG/DL (ref 250–450)
WBC # BLD AUTO: 7.4 K/UL (ref 4.8–10.8)

## 2018-12-20 PROCEDURE — G8979 MOBILITY GOAL STATUS: HCPCS | Mod: CK

## 2018-12-20 PROCEDURE — 83540 ASSAY OF IRON: CPT | Mod: 91

## 2018-12-20 PROCEDURE — G8988 SELF CARE GOAL STATUS: HCPCS | Mod: CJ

## 2018-12-20 PROCEDURE — A9270 NON-COVERED ITEM OR SERVICE: HCPCS | Performed by: INTERNAL MEDICINE

## 2018-12-20 PROCEDURE — 770020 HCHG ROOM/CARE - TELE (206)

## 2018-12-20 PROCEDURE — 97166 OT EVAL MOD COMPLEX 45 MIN: CPT

## 2018-12-20 PROCEDURE — G8987 SELF CARE CURRENT STATUS: HCPCS | Mod: CK

## 2018-12-20 PROCEDURE — 83550 IRON BINDING TEST: CPT | Mod: 91

## 2018-12-20 PROCEDURE — 700111 HCHG RX REV CODE 636 W/ 250 OVERRIDE (IP): Performed by: INTERNAL MEDICINE

## 2018-12-20 PROCEDURE — 85025 COMPLETE CBC W/AUTO DIFF WBC: CPT

## 2018-12-20 PROCEDURE — 82728 ASSAY OF FERRITIN: CPT

## 2018-12-20 PROCEDURE — 83735 ASSAY OF MAGNESIUM: CPT

## 2018-12-20 PROCEDURE — 97162 PT EVAL MOD COMPLEX 30 MIN: CPT

## 2018-12-20 PROCEDURE — G8978 MOBILITY CURRENT STATUS: HCPCS | Mod: CL

## 2018-12-20 PROCEDURE — 99232 SBSQ HOSP IP/OBS MODERATE 35: CPT | Performed by: INTERNAL MEDICINE

## 2018-12-20 PROCEDURE — 700101 HCHG RX REV CODE 250: Performed by: INTERNAL MEDICINE

## 2018-12-20 PROCEDURE — 700102 HCHG RX REV CODE 250 W/ 637 OVERRIDE(OP): Performed by: INTERNAL MEDICINE

## 2018-12-20 PROCEDURE — 80053 COMPREHEN METABOLIC PANEL: CPT

## 2018-12-20 RX ORDER — MAGNESIUM SULFATE HEPTAHYDRATE 40 MG/ML
4 INJECTION, SOLUTION INTRAVENOUS ONCE
Status: COMPLETED | OUTPATIENT
Start: 2018-12-20 | End: 2018-12-20

## 2018-12-20 RX ADMIN — METFORMIN HYDROCHLORIDE 500 MG: 500 TABLET, FILM COATED ORAL at 08:18

## 2018-12-20 RX ADMIN — METOPROLOL TARTRATE 50 MG: 50 TABLET ORAL at 18:10

## 2018-12-20 RX ADMIN — QUETIAPINE FUMARATE 25 MG: 25 TABLET ORAL at 20:16

## 2018-12-20 RX ADMIN — ENOXAPARIN SODIUM 80 MG: 100 INJECTION SUBCUTANEOUS at 05:55

## 2018-12-20 RX ADMIN — STANDARDIZED SENNA CONCENTRATE AND DOCUSATE SODIUM 2 TABLET: 8.6; 5 TABLET, FILM COATED ORAL at 05:55

## 2018-12-20 RX ADMIN — OMEPRAZOLE 20 MG: 20 CAPSULE, DELAYED RELEASE ORAL at 18:10

## 2018-12-20 RX ADMIN — ATORVASTATIN CALCIUM 10 MG: 10 TABLET, FILM COATED ORAL at 05:55

## 2018-12-20 RX ADMIN — MIRTAZAPINE 7.5 MG: 15 TABLET, ORALLY DISINTEGRATING ORAL at 20:16

## 2018-12-20 RX ADMIN — MAGNESIUM SULFATE IN WATER 4 G: 40 INJECTION, SOLUTION INTRAVENOUS at 11:02

## 2018-12-20 RX ADMIN — METFORMIN HYDROCHLORIDE 500 MG: 500 TABLET, FILM COATED ORAL at 18:10

## 2018-12-20 RX ADMIN — OMEPRAZOLE 20 MG: 20 CAPSULE, DELAYED RELEASE ORAL at 05:55

## 2018-12-20 RX ADMIN — STANDARDIZED SENNA CONCENTRATE AND DOCUSATE SODIUM 2 TABLET: 8.6; 5 TABLET, FILM COATED ORAL at 18:10

## 2018-12-20 RX ADMIN — POTASSIUM CHLORIDE AND SODIUM CHLORIDE: 900; 300 INJECTION, SOLUTION INTRAVENOUS at 11:08

## 2018-12-20 RX ADMIN — METOPROLOL TARTRATE 50 MG: 50 TABLET ORAL at 05:55

## 2018-12-20 RX ADMIN — RIVAROXABAN 15 MG: 15 TABLET, FILM COATED ORAL at 18:10

## 2018-12-20 ASSESSMENT — GAIT ASSESSMENTS
ASSISTIVE DEVICE: FRONT WHEEL WALKER
DISTANCE (FEET): 6
GAIT LEVEL OF ASSIST: CONTACT GUARD ASSIST

## 2018-12-20 ASSESSMENT — COGNITIVE AND FUNCTIONAL STATUS - GENERAL
DRESSING REGULAR UPPER BODY CLOTHING: A LITTLE
EATING MEALS: A LITTLE
DRESSING REGULAR LOWER BODY CLOTHING: A LOT
DAILY ACTIVITIY SCORE: 14
PERSONAL GROOMING: A LITTLE
SUGGESTED CMS G CODE MODIFIER DAILY ACTIVITY: CK
HELP NEEDED FOR BATHING: A LOT
TOILETING: TOTAL

## 2018-12-20 ASSESSMENT — PAIN SCALES - GENERAL
PAINLEVEL_OUTOF10: 0

## 2018-12-20 ASSESSMENT — ACTIVITIES OF DAILY LIVING (ADL): TOILETING: INDEPENDENT

## 2018-12-20 NOTE — THERAPY
"Physical Therapy Evaluation completed.   Bed Mobility:  Supine to Sit: Minimal Assist  Transfers: Sit to Stand: Minimal Assist  Gait: Level Of Assist: Contact Guard Assist with Front-Wheel Walker       Plan of Care: Will benefit from Physical Therapy 3 times per week  Discharge Recommendations: Equipment: No Equipment Needed. Post-acute therapy Discharge to a transitional care facility for continued skilled therapy services.  78 year old male admitted with abdominal distension and urine retention Pt has a history of dementia,he lives in an assisted living and is only able to ambulate short distances with a fww.Acute PT needed to improve bed mob transfers and ambulation so able to go back to assisted living  See \"Rehab Therapy-Acute\" Patient Summary Report for complete documentation.     "

## 2018-12-20 NOTE — PROGRESS NOTES
[facility] Medicine Daily Progress Note    Date of Service  12/19/2018    Chief Complaint  78 y.o. male admitted 12/18/2018 with abdominal distention and discomfort.    Hospital Course     is a very pleasant 70-year-old male with a past medical history of underlying dementia, currently living in an assisted living facility presented to the ER for abdominal distention, but patient himself is a very poor historian and cannot tell me why he is here, however patient was noted to have a very distended bladder over 2600 cc for which decompression was provided with a Erwin catheter.  In addition patient was diagnosed with having a bilateral lower extremity DVTs      Interval Problem Update  No acute events overnight patient overall a poor historian, is comfortable  Continue Lovenox weight-based for bilateral DVTs  Continue erwin to gravity.  SW consult     Consultants/Specialty  None    Code Status  DNAR/DNI    Disposition  SNF in [place]?    Review of Systems  Review of Systems   Unable to perform ROS: Dementia        Physical Exam  Temp:  [36.3 °C (97.3 °F)-37 °C (98.6 °F)] 36.9 °C (98.4 °F)  Pulse:  [] 107  Resp:  [16-18] 18  BP: (107-145)/(60-80) 107/60    Physical Exam   Constitutional: He appears well-developed. No distress.   HENT:   Head: Normocephalic and atraumatic.   Mouth/Throat: No oropharyngeal exudate.   Eyes: Pupils are equal, round, and reactive to light. Conjunctivae are normal. Right eye exhibits no discharge. No scleral icterus.   Neck: Neck supple. No JVD present. No thyromegaly present.   Cardiovascular: Intact distal pulses.    No murmur heard.  Pulses:       Dorsalis pedis pulses are 2+ on the right side, and 2+ on the left side.   Cap refill < 3 s  Irregularly irregular.     Pulmonary/Chest: Effort normal and breath sounds normal. No stridor. No respiratory distress. He has no wheezes. He has no rales.   Abdominal: Soft. Bowel sounds are normal. He exhibits no distension. There is no  tenderness. There is no rebound.   Genitourinary:   Genitourinary Comments: +erwin   Musculoskeletal: Normal range of motion. He exhibits no edema.   Neurological: He is alert. No cranial nerve deficit.   Skin: Skin is warm and dry. He is not diaphoretic. No erythema.   Psychiatric: His behavior is normal.       Fluids    Intake/Output Summary (Last 24 hours) at 12/19/18 1630  Last data filed at 12/19/18 1543   Gross per 24 hour   Intake            402.5 ml   Output             2400 ml   Net          -1997.5 ml       Laboratory  Recent Labs      12/18/18   1335  12/19/18   0449   WBC  8.1  7.9   RBC  4.34*  4.04*   HEMOGLOBIN  13.0*  12.1*   HEMATOCRIT  40.6*  37.5*   MCV  93.5  92.8   MCH  30.0  30.0   MCHC  32.0*  32.3*   RDW  46.5  46.8   PLATELETCT  160*  154*   MPV  11.1  11.3     Recent Labs      12/18/18   1335  12/19/18   0450   SODIUM  138  140   POTASSIUM  3.2*  3.0*   CHLORIDE  105  106   CO2  25  27   GLUCOSE  130*  91   BUN  20  14   CREATININE  0.92  0.87   CALCIUM  8.5  8.5                   Imaging  US-EXTREMITY VENOUS LOWER BILAT   Final Result      Extensive bilateral lower extremity deep vein thrombosis.      Thrombosis within the greater saphenous veins bilaterally.      Currently awaiting callback from Lorena Gonzales.      CT-ABDOMEN-PELVIS WITH   Final Result         1. Significantly distended urinary bladder likely secondary to bladder outlet obstruction.      2. Moderate bilateral hydronephrosis, likely secondary to back pressure from distended urine bladder.      3. Mild infrarenal abdominal aortic aneurysm. Ascending aortic aneurysm, measuring 4.2 cm.      4. Questionable poor enhancement of the bilateral iliac and inguinal veins could be due to timing of contrast. Correlate with ultrasound.      5. Peripheral opacity opacities in the lung bases could be seen with interstitial lung disease.           Assessment/Plan  * Urinary retention   Assessment & Plan    Distended on admission, s/p  erwin insertion with 2600cc drainage.  Resume Flomax.  patient will need outpatient urology follow-up with at least one week Erwin catheter placement for bladder decompression     Generalized weakness- (present on admission)   Assessment & Plan    Currently lives in assisted living facility, has family in [place].  Awaiting for family decision on SNF placement.     Hypokalemia- (present on admission)   Assessment & Plan    K:3.0 today  Replenish lytes, will repeat bmp in the morning for any changes       Deep vein thrombosis (DVT) ([facility])   Assessment & Plan    + bilateral LE DVT  Resume lovenox weight based until transition to PO.     Thrombocytopenia (HCC)   Assessment & Plan    Chronic, no evidence of gross bleeding  CTM     Dementia   Assessment & Plan    Poor historian,  Resume home dose of Seroquel and Remeron.       Cardiac pacemaker in situ- (present on admission)   Assessment & Plan    Paced rhythm   Stable      Type 2 diabetes mellitus without complication, without long-term current use of insulin (HCC)- (present on admission)   Assessment & Plan    Continue metformin  Check a1c.       Permanent atrial fibrillation ([facility])- (present on admission)   Assessment & Plan    Rate controlled  Resume metoprolol.  Most likely not a good candidate for long-term anticoagulation for atrial fibrillation however for his bilateral lower extremity edema will at least require 3 months.            The total time spent face to face with this patient was about 35 mins of which 60% of time was spent on counseling, review of records including pertinent lab data and studies, as well as discussing diagnostic evaluation and work up, planned therapeutic interventions and future disposition of care. Where indicated, the assessment and plan reflect discussion of patient with consultants, other healthcare providers, family members, and additional research needed to obtain further information in formulating the plan of care of this  patient.       VTE prophylaxis: Lovenox weight based

## 2018-12-20 NOTE — DISCHARGE PLANNING
Received Choice form at 0556  Agency/Facility Name: [facility]  Referral sent per Choice form @ 4830

## 2018-12-20 NOTE — THERAPY
"Occupational Therapy Evaluation completed.   Functional Status: Pt is a 79 y/o male, admit with urinary retention, DVTs. Pt lives in an assisted living facility, where they assist with meals, housekeeping and showers - Per Pt report. PLOF- Unclear, as Pt was a poor historian. He reported being I with the exception of showers. Pt presents with decreased generalized strength and endurance, decreased safety, decreased I with ADLS and functional mobility. Pt requires Min A for UB, Max A for LB self care, was unable to AMB , sit to stands required Min A x2. Pt will benefit from Acute OT services to increase functional I and safety prior to d/c.  Plan of Care: Will benefit from Occupational Therapy 3 times per week  Discharge Recommendations:  Equipment: Will Continue to Assess for Equipment Needs. Post-acute therapy Discharge to a transitional care facility for continued skilled therapy services.    See \"Rehab Therapy-Acute\" Patient Summary Report for complete documentation.    "

## 2018-12-20 NOTE — DISCHARGE PLANNING
Anticipated Discharge Disposition: SNF/[facility]    Action: L[facility] and [facility] to discuss pt. LSW requested pt be placed on the waiting list of [facility] for continued PT/OT.     Barriers to Discharge: None    Plan: Pending acceptance and m/c.

## 2018-12-20 NOTE — PROGRESS NOTES
Telemetry Shift Summary    Rhythm SR/ST, First degree AVB, AV paced on demand  HR Range High 50's to Low 100's  Ectopy Frequent PVC's, PAC's, and couplets  Measurements 0.24/0.08/0.34        Normal Values  Rhythm SR  HR Range    Measurements 0.12-0.20 / 0.06-0.10  / 0.30-0.52

## 2018-12-20 NOTE — PROGRESS NOTES
[facility] Medicine Daily Progress Note    Date of Service  12/20/2018    Chief Complaint  78 y.o. male admitted 12/18/2018 with abdominal distention and discomfort.    Hospital Course     is a very pleasant 70-year-old male with a past medical history of underlying dementia, currently living in an assisted living facility presented to the ER for abdominal distention, found to have urinary retention with over 2600 cc removed for Erwin placement.  In addition patient was diagnosed with having a bilateral lower extremity DVTs      Interval Problem Update  12/20: Tolerating anticoagulation, change to Xarelto.  Erwin catheter in place, good urine output.  Pending skilled nursing placement    Consultants/Specialty  None    Code Status  DNAR/DNI    [facility] [place]    Review of Systems  Review of Systems   Unable to perform ROS: Dementia        Physical Exam  Temp:  [36.4 °C (97.5 °F)-37.6 °C (99.6 °F)] 36.4 °C (97.5 °F)  Pulse:  [65-81] 69  Resp:  [18] 18  BP: (113-135)/(61-71) 128/67    Physical Exam   Constitutional: He appears well-developed. No distress.   HENT:   Head: Normocephalic and atraumatic.   Mouth/Throat: No oropharyngeal exudate.   Eyes: Pupils are equal, round, and reactive to light. Conjunctivae are normal. Right eye exhibits no discharge. No scleral icterus.   Neck: Neck supple. No JVD present. No thyromegaly present.   Cardiovascular: Intact distal pulses.    No murmur heard.  Pulses:       Dorsalis pedis pulses are 2+ on the right side, and 2+ on the left side.        Pulmonary/Chest: Effort normal and breath sounds normal. No stridor. No respiratory distress. He has no wheezes. He has no rales.   Abdominal: Soft. Bowel sounds are normal. He exhibits no distension. There is no tenderness. There is no rebound.   Genitourinary:   Genitourinary Comments: +erwin   Musculoskeletal: Normal range of motion. He exhibits no edema.   Neurological: He is alert. No cranial nerve  deficit.   Skin: Skin is warm and dry. He is not diaphoretic. No erythema. There is pallor.   Psychiatric: He is slowed. Cognition and memory are impaired.       Fluids    Intake/Output Summary (Last 24 hours) at 12/20/18 1530  Last data filed at 12/20/18 1139   Gross per 24 hour   Intake              120 ml   Output             2850 ml   Net            -2730 ml       Laboratory  Recent Labs      12/18/18   1335  12/19/18   0449  12/20/18   0410   WBC  8.1  7.9  7.4   RBC  4.34*  4.04*  3.83*   HEMOGLOBIN  13.0*  12.1*  11.4*   HEMATOCRIT  40.6*  37.5*  35.4*   MCV  93.5  92.8  92.4   MCH  30.0  30.0  29.8   MCHC  32.0*  32.3*  32.2*   RDW  46.5  46.8  46.5   PLATELETCT  160*  154*  156*   MPV  11.1  11.3  10.8     Recent Labs      12/18/18   1335  12/19/18   0450  12/20/18   0410   SODIUM  138  140  138   POTASSIUM  3.2*  3.0*  3.3*   CHLORIDE  105  106  107   CO2  25  27  26   GLUCOSE  130*  91  89   BUN  20  14  9   CREATININE  0.92  0.87  0.79   CALCIUM  8.5  8.5  8.0*                   Imaging  US-EXTREMITY VENOUS LOWER BILAT   Final Result      Extensive bilateral lower extremity deep vein thrombosis.      Thrombosis within the greater saphenous veins bilaterally.      Currently awaiting callback from Lorena Gonzales.      CT-ABDOMEN-PELVIS WITH   Final Result         1. Significantly distended urinary bladder likely secondary to bladder outlet obstruction.      2. Moderate bilateral hydronephrosis, likely secondary to back pressure from distended urine bladder.      3. Mild infrarenal abdominal aortic aneurysm. Ascending aortic aneurysm, measuring 4.2 cm.      4. Questionable poor enhancement of the bilateral iliac and inguinal veins could be due to timing of contrast. Correlate with ultrasound.      5. Peripheral opacity opacities in the lung bases could be seen with interstitial lung disease.           Assessment/Plan  * Urinary retention   Assessment & Plan    Distended on admission, s/p erwin insertion  with 2600cc drainage.  Resume Flomax.  patient will need outpatient urology follow-up with at least one week Cabello catheter placement for bladder decompression  He will discharge with Cabello catheter     Generalized weakness- (present on admission)   Assessment & Plan    Currently lives in assisted living facility, has family in [place].  Awaiting for family decision on SNF placement, [facility]     Hypokalemia- (present on admission)   Assessment & Plan    K:3.3 today  Replenish lytes, will repeat bmp in the morning for any changes     Hypomagnesemia   Assessment & Plan    Replace IV, repeat in the morning     Normocytic anemia   Assessment & Plan    Slow downtrend, patient denies any blood loss.  He is a poor historian  Check iron studies     Deep vein thrombosis (DVT) ([facility])   Assessment & Plan    + bilateral LE DVT  He has been tolerating Lovenox  Change to Xarelto which he will discharged on     Thrombocytopenia ([facility])   Assessment & Plan    Chronic, no evidence of gross bleeding  CTM     Dementia   Assessment & Plan    Poor historian,  Resume home dose of Seroquel and Remeron.       Cardiac pacemaker in situ- (present on admission)   Assessment & Plan    Paced rhythm   Stable      Type 2 diabetes mellitus without complication, without long-term current use of insulin ([facility])- (present on admission)   Assessment & Plan    Continue metformin  Check a1c.       Permanent atrial fibrillation ([facility])- (present on admission)   Assessment & Plan    Rate controlled  Resume metoprolol.  Most likely not a good candidate for long-term anticoagulation for atrial fibrillation however for his bilateral lower extremity edema will at least require 3 months.              VTE prophylaxis: Xarelto

## 2018-12-20 NOTE — CARE PLAN
Problem: Safety  Goal: Will remain free from falls  Outcome: PROGRESSING AS EXPECTED  Patient does not attempt to get OOB.  Verbally demonstrates appropriate use of call bell.  Bed alarm on.  Call bell and personal belongings within reach.  Non-skid socks on.  Patient instructed to call for assistance prior to attempting to ambulate.    Problem: Discharge Barriers/Planning  Goal: Patient's continuum of care needs will be met  Outcome: PROGRESSING AS EXPECTED  Patient was admitted from assisted living facility, will need higher level of care on discharge.

## 2018-12-20 NOTE — PROGRESS NOTES
Bedside shift report received from Roberta Orozco RN.  Patient resting comfortably in bed.  Bed alarm on.  Call bell and personal belongings within reach.  Patient instructed to call for assistance prior to attempting to get OOB.  NOC POC reviewed with patient, patient in agreement with POC.

## 2018-12-21 LAB
ALBUMIN SERPL BCP-MCNC: 2.7 G/DL (ref 3.2–4.9)
BASOPHILS # BLD AUTO: 0.6 % (ref 0–1.8)
BASOPHILS # BLD: 0.04 K/UL (ref 0–0.12)
BUN SERPL-MCNC: 7 MG/DL (ref 8–22)
CALCIUM SERPL-MCNC: 7.6 MG/DL (ref 8.4–10.2)
CHLORIDE SERPL-SCNC: 107 MMOL/L (ref 96–112)
CO2 SERPL-SCNC: 23 MMOL/L (ref 20–33)
CREAT SERPL-MCNC: 0.7 MG/DL (ref 0.5–1.4)
EOSINOPHIL # BLD AUTO: 0.21 K/UL (ref 0–0.51)
EOSINOPHIL NFR BLD: 3.3 % (ref 0–6.9)
ERYTHROCYTE [DISTWIDTH] IN BLOOD BY AUTOMATED COUNT: 46.5 FL (ref 35.9–50)
FERRITIN SERPL-MCNC: 691.1 NG/ML (ref 22–322)
GLUCOSE SERPL-MCNC: 90 MG/DL (ref 65–99)
HCT VFR BLD AUTO: 35.4 % (ref 42–52)
HGB BLD-MCNC: 11.4 G/DL (ref 14–18)
IMM GRANULOCYTES # BLD AUTO: 0.03 K/UL (ref 0–0.11)
IMM GRANULOCYTES NFR BLD AUTO: 0.5 % (ref 0–0.9)
IRON SATN MFR SERPL: 8 % (ref 15–55)
IRON SERPL-MCNC: 18 UG/DL (ref 50–180)
LYMPHOCYTES # BLD AUTO: 1.46 K/UL (ref 1–4.8)
LYMPHOCYTES NFR BLD: 23 % (ref 22–41)
MAGNESIUM SERPL-MCNC: 1.8 MG/DL (ref 1.5–2.5)
MCH RBC QN AUTO: 29.8 PG (ref 27–33)
MCHC RBC AUTO-ENTMCNC: 32.2 G/DL (ref 33.7–35.3)
MCV RBC AUTO: 92.4 FL (ref 81.4–97.8)
MONOCYTES # BLD AUTO: 0.86 K/UL (ref 0–0.85)
MONOCYTES NFR BLD AUTO: 13.6 % (ref 0–13.4)
NEUTROPHILS # BLD AUTO: 3.74 K/UL (ref 1.82–7.42)
NEUTROPHILS NFR BLD: 59 % (ref 44–72)
NRBC # BLD AUTO: 0 K/UL
NRBC BLD-RTO: 0 /100 WBC
PHOSPHATE SERPL-MCNC: 1.6 MG/DL (ref 2.5–4.5)
PLATELET # BLD AUTO: 182 K/UL (ref 164–446)
PMV BLD AUTO: 11.5 FL (ref 9–12.9)
POTASSIUM SERPL-SCNC: 3.3 MMOL/L (ref 3.6–5.5)
RBC # BLD AUTO: 3.83 M/UL (ref 4.7–6.1)
SODIUM SERPL-SCNC: 136 MMOL/L (ref 135–145)
TEST NAME 95000: ABNORMAL
TIBC SERPL-MCNC: 225 UG/DL (ref 250–450)
WBC # BLD AUTO: 6.3 K/UL (ref 4.8–10.8)

## 2018-12-21 PROCEDURE — G8998 SWALLOW D/C STATUS: HCPCS | Mod: CH

## 2018-12-21 PROCEDURE — 700102 HCHG RX REV CODE 250 W/ 637 OVERRIDE(OP): Performed by: INTERNAL MEDICINE

## 2018-12-21 PROCEDURE — 85025 COMPLETE CBC W/AUTO DIFF WBC: CPT

## 2018-12-21 PROCEDURE — 770020 HCHG ROOM/CARE - TELE (206)

## 2018-12-21 PROCEDURE — 700111 HCHG RX REV CODE 636 W/ 250 OVERRIDE (IP): Performed by: INTERNAL MEDICINE

## 2018-12-21 PROCEDURE — 83735 ASSAY OF MAGNESIUM: CPT

## 2018-12-21 PROCEDURE — 80069 RENAL FUNCTION PANEL: CPT

## 2018-12-21 PROCEDURE — A9270 NON-COVERED ITEM OR SERVICE: HCPCS | Performed by: INTERNAL MEDICINE

## 2018-12-21 PROCEDURE — 700101 HCHG RX REV CODE 250: Performed by: INTERNAL MEDICINE

## 2018-12-21 PROCEDURE — 92610 EVALUATE SWALLOWING FUNCTION: CPT

## 2018-12-21 PROCEDURE — G8996 SWALLOW CURRENT STATUS: HCPCS | Mod: CH

## 2018-12-21 PROCEDURE — 99232 SBSQ HOSP IP/OBS MODERATE 35: CPT | Performed by: INTERNAL MEDICINE

## 2018-12-21 PROCEDURE — G8997 SWALLOW GOAL STATUS: HCPCS | Mod: CH

## 2018-12-21 RX ORDER — FERROUS SULFATE 325(65) MG
325 TABLET ORAL
Status: DISCONTINUED | OUTPATIENT
Start: 2018-12-22 | End: 2018-12-22 | Stop reason: HOSPADM

## 2018-12-21 RX ORDER — MAGNESIUM SULFATE HEPTAHYDRATE 40 MG/ML
2 INJECTION, SOLUTION INTRAVENOUS ONCE
Status: COMPLETED | OUTPATIENT
Start: 2018-12-21 | End: 2018-12-21

## 2018-12-21 RX ORDER — POTASSIUM CHLORIDE 20 MEQ/1
40 TABLET, EXTENDED RELEASE ORAL ONCE
Status: COMPLETED | OUTPATIENT
Start: 2018-12-21 | End: 2018-12-21

## 2018-12-21 RX ORDER — TAMSULOSIN HYDROCHLORIDE 0.4 MG/1
0.4 CAPSULE ORAL
Status: DISCONTINUED | OUTPATIENT
Start: 2018-12-21 | End: 2018-12-22 | Stop reason: HOSPADM

## 2018-12-21 RX ADMIN — OMEPRAZOLE 20 MG: 20 CAPSULE, DELAYED RELEASE ORAL at 17:55

## 2018-12-21 RX ADMIN — DIBASIC SODIUM PHOSPHATE, MONOBASIC POTASSIUM PHOSPHATE AND MONOBASIC SODIUM PHOSPHATE 2 TABLET: 852; 155; 130 TABLET ORAL at 17:55

## 2018-12-21 RX ADMIN — RIVAROXABAN 15 MG: 15 TABLET, FILM COATED ORAL at 17:55

## 2018-12-21 RX ADMIN — METFORMIN HYDROCHLORIDE 500 MG: 500 TABLET, FILM COATED ORAL at 17:55

## 2018-12-21 RX ADMIN — OMEPRAZOLE 20 MG: 20 CAPSULE, DELAYED RELEASE ORAL at 05:27

## 2018-12-21 RX ADMIN — POTASSIUM CHLORIDE AND SODIUM CHLORIDE: 900; 300 INJECTION, SOLUTION INTRAVENOUS at 05:35

## 2018-12-21 RX ADMIN — METOPROLOL TARTRATE 50 MG: 50 TABLET ORAL at 05:27

## 2018-12-21 RX ADMIN — QUETIAPINE FUMARATE 25 MG: 25 TABLET ORAL at 22:17

## 2018-12-21 RX ADMIN — RIVAROXABAN 15 MG: 15 TABLET, FILM COATED ORAL at 07:34

## 2018-12-21 RX ADMIN — DIBASIC SODIUM PHOSPHATE, MONOBASIC POTASSIUM PHOSPHATE AND MONOBASIC SODIUM PHOSPHATE 2 TABLET: 852; 155; 130 TABLET ORAL at 11:19

## 2018-12-21 RX ADMIN — ATORVASTATIN CALCIUM 10 MG: 10 TABLET, FILM COATED ORAL at 05:27

## 2018-12-21 RX ADMIN — STANDARDIZED SENNA CONCENTRATE AND DOCUSATE SODIUM 2 TABLET: 8.6; 5 TABLET, FILM COATED ORAL at 05:27

## 2018-12-21 RX ADMIN — METFORMIN HYDROCHLORIDE 500 MG: 500 TABLET, FILM COATED ORAL at 07:34

## 2018-12-21 RX ADMIN — TAMSULOSIN HYDROCHLORIDE 0.4 MG: 0.4 CAPSULE ORAL at 11:19

## 2018-12-21 RX ADMIN — MIRTAZAPINE 7.5 MG: 15 TABLET, ORALLY DISINTEGRATING ORAL at 22:17

## 2018-12-21 RX ADMIN — POTASSIUM CHLORIDE 40 MEQ: 1500 TABLET, EXTENDED RELEASE ORAL at 11:19

## 2018-12-21 RX ADMIN — MAGNESIUM SULFATE HEPTAHYDRATE 2 G: 40 INJECTION, SOLUTION INTRAVENOUS at 11:19

## 2018-12-21 RX ADMIN — METOPROLOL TARTRATE 50 MG: 50 TABLET ORAL at 18:05

## 2018-12-21 ASSESSMENT — PAIN SCALES - GENERAL
PAINLEVEL_OUTOF10: 0

## 2018-12-21 NOTE — CARE PLAN
Problem: Safety  Goal: Will remain free from falls  Outcome: PROGRESSING AS EXPECTED  Reinforced fall risk precautions. Bed alarm on, Non-skid socks on feet, call light in reach. 2 person assist to the chair plus walker. Offer toileting frequently.     Problem: Infection  Goal: Will remain free from infection  Outcome: PROGRESSING AS EXPECTED  Good hand and oral hygiene promoted. Afebrile, WBCs 6.3. Standard precaution in place. Perform hand washing.     Problem: Bowel/Gastric:  Goal: Normal bowel function is maintained or improved  Outcome: PROGRESSING AS EXPECTED  Incontinent x1 of loose BM.     Problem: Skin Integrity  Goal: Risk for impaired skin integrity will decrease  Outcome: PROGRESSING AS EXPECTED  Mepilex in place. Turning patient Q2 hrs. Barrier cream applied to Ashley-area. Encouraged good oral intake and activity.     Problem: Urinary Elimination:  Goal: Ability to reestablish a normal urinary elimination pattern will improve  Outcome: PROGRESSING AS EXPECTED  Cabello catheter in place draining clear yellow urine. No c/o discomfort at insertion site. Catheter care done.

## 2018-12-21 NOTE — PROGRESS NOTES
Report received from RUPERTO Moore. Patient resting comfortably in bed. Declines any needs at this time. Call light in reach. Bed alarm on.

## 2018-12-21 NOTE — PROGRESS NOTES
Report given to night shift RN. POC and orders reviewed. All needs met. Safety precautions maintained.

## 2018-12-21 NOTE — CARE PLAN
Problem: Infection  Goal: Will remain free from infection  Outcome: PROGRESSING AS EXPECTED  No new signs or symptoms of infection at this time.     Problem: Skin Integrity  Goal: Risk for impaired skin integrity will decrease  Outcome: PROGRESSING AS EXPECTED  Patient currently compliant with turning and repositioning.

## 2018-12-21 NOTE — DISCHARGE PLANNING
Agency/Facility Name: [facility]  Spoke To: Osmar  Outcome: Patient accepted and they should have open bed tomorrow, however, wife is not sure because she will have a co-pay at [facility], sent referral to [facility] as that is wife's second choice.

## 2018-12-21 NOTE — THERAPY
"Speech Language Therapy Clinical Swallow Evaluation completed.    Functional Status: Pt was seen at bedside for CSE. Pt was alert, oriented x3, and participatory with therapy objectives. Pt denied baseline swallowing difficulties, hx of PNA. Per RN, there is concern for burping when taking pills only, and difficulties with feeding himself. Unsure as to how much dementia vs. AMS vs. other factors are contributing to Pt's current presentation. Oral mech was unremarkable. Pt was administered PO trials of thin liquids (cup sips), Dysphagia 2 and 3 textures and Pt declined Regular. Pt demonstrated no clinical s/sx of aspiration/penetration with sips of thins and/or bites of soft/mixed solids. Pt with prolonged mastication with solid PO trials; however, this is likely due to hx of dementia. No oral residue and/or globus sensation was present, per Pt. Given adherence to safe swallow precautions, Pt is recommended to con't current diet of Regular textures, as Pt has demonstrated no clinical s/sx of aspiration with current diet. No further inpatient skilled SLP services are warranted. SLP to sign-off. Note: Consider referral for OT services, given Pt's difficulties with self-feeding, and unsteadiness with bringing spoon from bowl to mouth. Also, consider referral to GI if other esophageal issues arise (e.g. burping, acid reflux, vomiting, etc.) SLP to sign-off. RN updated. Thank you for the consult.     Recommendations - Diet: Regular, Thin Liquid                          Strategies: Monitor during meals, Assistance needed for meal tray set-up and Head of Bed at 90 Degrees                          Medication Administration: Whole with Liquid Wash, Float Whole with Puree    Plan of Care: Patient with no further skilled SLP needs in the acute care setting at this time  Post-Acute Therapy: Anticipate that the patient will have no further speech therapy needs after discharge from the hospital.    See \"Rehab Therapy-Acute\" " Patient Summary Report for complete documentation.

## 2018-12-21 NOTE — DISCHARGE PLANNING
Agency/Facility Name: [facility]  Spoke To: Correspondence  Outcome: Patient declined due to insurance.

## 2018-12-21 NOTE — PROGRESS NOTES
A/Ox2. C/o pain in RUE with activity. Incontinent of loose stool x1.     Up to chair with two person assist plus walker. Patient was weak and required max verbal and physical cues to get from bed to chair.     Swallows pills well but must be one at a time and larger pills broken in half. Frequent burping during med pass between pill swallows

## 2018-12-21 NOTE — DISCHARGE PLANNING
Anticipated Discharge Disposition: SNF    Action: LSW spoke w/ pt's daughter to explain and receive choice for SNF. Pt's daughter reports 1) [facility], 2) [facility]. [facility] notified.     Barriers to Discharge: None    Plan: Pending acceptance and m/c.

## 2018-12-21 NOTE — PROGRESS NOTES
Telemetry:    Rate: 60-80s  Rhythm: A paced and AV paced on demand; sinus rhythm  Ectopy: Frequent pacs; occasional pvcs    Measurement:  0.24/0.06/0.36

## 2018-12-21 NOTE — PROGRESS NOTES
Patient requires assistance with feeding himself. Finger foods for next meals. Speech saw patient, appears to be related to dementia and/or GI issue and not swallowing.

## 2018-12-22 VITALS
RESPIRATION RATE: 18 BRPM | DIASTOLIC BLOOD PRESSURE: 58 MMHG | BODY MASS INDEX: 28.73 KG/M2 | TEMPERATURE: 97.5 F | HEART RATE: 85 BPM | SYSTOLIC BLOOD PRESSURE: 96 MMHG | OXYGEN SATURATION: 96 % | HEIGHT: 68 IN | WEIGHT: 189.6 LBS

## 2018-12-22 LAB
ALBUMIN SERPL BCP-MCNC: 2.6 G/DL (ref 3.2–4.9)
BUN SERPL-MCNC: 7 MG/DL (ref 8–22)
CALCIUM SERPL-MCNC: 7.4 MG/DL (ref 8.4–10.2)
CHLORIDE SERPL-SCNC: 110 MMOL/L (ref 96–112)
CO2 SERPL-SCNC: 22 MMOL/L (ref 20–33)
CREAT SERPL-MCNC: 0.67 MG/DL (ref 0.5–1.4)
GLUCOSE SERPL-MCNC: 113 MG/DL (ref 65–99)
PHOSPHATE SERPL-MCNC: 1.7 MG/DL (ref 2.5–4.5)
POTASSIUM SERPL-SCNC: 3.8 MMOL/L (ref 3.6–5.5)
SODIUM SERPL-SCNC: 135 MMOL/L (ref 135–145)

## 2018-12-22 PROCEDURE — A9270 NON-COVERED ITEM OR SERVICE: HCPCS | Performed by: INTERNAL MEDICINE

## 2018-12-22 PROCEDURE — 80069 RENAL FUNCTION PANEL: CPT

## 2018-12-22 PROCEDURE — 700102 HCHG RX REV CODE 250 W/ 637 OVERRIDE(OP): Performed by: INTERNAL MEDICINE

## 2018-12-22 PROCEDURE — 99239 HOSP IP/OBS DSCHRG MGMT >30: CPT | Performed by: INTERNAL MEDICINE

## 2018-12-22 PROCEDURE — 700101 HCHG RX REV CODE 250: Performed by: INTERNAL MEDICINE

## 2018-12-22 RX ORDER — FERROUS SULFATE 325(65) MG
325 TABLET ORAL
Qty: 30 TAB | Refills: 2 | Status: SHIPPED | OUTPATIENT
Start: 2018-12-23 | End: 2019-01-29

## 2018-12-22 RX ORDER — TAMSULOSIN HYDROCHLORIDE 0.4 MG/1
0.4 CAPSULE ORAL DAILY
Qty: 20 CAP | Refills: 0 | Status: SHIPPED | OUTPATIENT
Start: 2018-12-22

## 2018-12-22 RX ADMIN — DIBASIC SODIUM PHOSPHATE, MONOBASIC POTASSIUM PHOSPHATE AND MONOBASIC SODIUM PHOSPHATE 2 TABLET: 852; 155; 130 TABLET ORAL at 14:05

## 2018-12-22 RX ADMIN — OMEPRAZOLE 20 MG: 20 CAPSULE, DELAYED RELEASE ORAL at 05:28

## 2018-12-22 RX ADMIN — POTASSIUM CHLORIDE AND SODIUM CHLORIDE: 900; 300 INJECTION, SOLUTION INTRAVENOUS at 03:50

## 2018-12-22 RX ADMIN — METOPROLOL TARTRATE 50 MG: 50 TABLET ORAL at 05:28

## 2018-12-22 RX ADMIN — RIVAROXABAN 15 MG: 15 TABLET, FILM COATED ORAL at 08:16

## 2018-12-22 RX ADMIN — METFORMIN HYDROCHLORIDE 500 MG: 500 TABLET, FILM COATED ORAL at 08:16

## 2018-12-22 RX ADMIN — FERROUS SULFATE TAB 325 MG (65 MG ELEMENTAL FE) 325 MG: 325 (65 FE) TAB at 08:16

## 2018-12-22 RX ADMIN — ATORVASTATIN CALCIUM 10 MG: 10 TABLET, FILM COATED ORAL at 05:27

## 2018-12-22 RX ADMIN — DIBASIC SODIUM PHOSPHATE, MONOBASIC POTASSIUM PHOSPHATE AND MONOBASIC SODIUM PHOSPHATE 2 TABLET: 852; 155; 130 TABLET ORAL at 05:28

## 2018-12-22 RX ADMIN — TAMSULOSIN HYDROCHLORIDE 0.4 MG: 0.4 CAPSULE ORAL at 08:16

## 2018-12-22 ASSESSMENT — PAIN SCALES - GENERAL: PAINLEVEL_OUTOF10: 0

## 2018-12-22 NOTE — PROGRESS NOTES
For med pass, patient took pills better with RN placing pill one at a time for patient.   Eating better with finger foods for dinner.   Report given to RUPERTO Del Rosario. Patient resting comfortably in bed. Declines any needs at this time. Call light in reach. Bed alarm on.

## 2018-12-22 NOTE — DISCHARGE PLANNING
Agency/Facility Name: [facility]   Spoke To: Gabino  Outcome: Patient accepted.    Received Transport Form @ 4804  Spoke to Luis @ [facility]    Transport is scheduled for 12/22 @1700 going to [facility].    CHAYA Boateng notified.  Gabino @ [facility] aware.

## 2018-12-22 NOTE — PROGRESS NOTES
Report received from RUPERTO Del Rosario. Patient resting comfortably in bed. Declines any needs at this time. Call light in reach. Bed alarm on.

## 2018-12-22 NOTE — CARE PLAN
Problem: Safety  Goal: Will remain free from falls  Outcome: PROGRESSING AS EXPECTED  Reinforced fall risk precautions. Bed alarm on, Non-skid socks on feet, call light in reach. 2 person assist to the chair    Problem: Bowel/Gastric:  Goal: Normal bowel function is maintained or improved  Outcome: PROGRESSING AS EXPECTED  No loose BM today.     Problem: Skin Integrity  Goal: Risk for impaired skin integrity will decrease  Outcome: PROGRESSING AS EXPECTED  Turning patient Q2 hrs. Up to chair, Mepilex in place on sacrum. Encouraging good PO food and fluid intake. Encouraging mobility.     Problem: Pain Management  Goal: Pain level will decrease to patient's comfort goal  Outcome: PROGRESSING AS EXPECTED  No c/o pain

## 2018-12-22 NOTE — DISCHARGE SUMMARY
Discharge Summary    CHIEF COMPLAINT ON ADMISSION  Chief Complaint   Patient presents with   • Constipation   • Abdominal Swelling       Reason for Admission  EMS     Admission Date  12/18/2018    CODE STATUS  DNAR/DNI    HPI & HOSPITAL COURSE     is a very pleasant 70-year-old male with a past medical history of underlying dementia, currently living in an assisted living facility presented to the ER for abdominal distention, found to have urinary retention with over 2600 cc removed for Cabello placement.  In addition patient was diagnosed with having a bilateral lower extremity DVTs .    A Cabello catheter was placed and he was started on Flomax.  His kidney function remained normal and he had good urine output following catheter placement.  He had no evidence of UTI.  He will need to have the catheter remain in place for at least 2 weeks and he will need follow-up with urology to discuss options for catheter removal and management per    Regarding his DVTs he was started on Lovenox therapy initially and then was transitioned to Xarelto.  He tolerated this well and had no evidence of bleeding.  His hemoglobin remained stable in the mid 11 range.  He was noted to have iron deficiency however, and was started on oral iron replacement therapy.    Regarding his diabetes he was continued on metformin    He does have advanced dementia therefore his daughter discussed goals of care and requested he be discharged to a skilled nursing facility for ongoing physical therapy and care as his needs are too high for the daughter alone.  Discharge to a skilled nursing facility was arranged for the patient and he was transferred there in stable condition.    Therefore, he is discharged in good and stable condition to home with close outpatient follow-up.    The patient met 2-midnight criteria for an inpatient stay at the time of discharge.    Discharge Date  12/22/2018    FOLLOW UP ITEMS POST DISCHARGE  Follow-up with primary  care physician at next available appointment  Discharge to skilled nursing facility    DISCHARGE DIAGNOSES  Principal Problem:    Urinary retention POA: Unknown  Active Problems:    Generalized weakness POA: Yes    Hypokalemia POA: Yes    Permanent atrial fibrillation (HCC) POA: Yes    Type 2 diabetes mellitus without complication, without long-term current use of insulin (HCC) POA: Yes    Cardiac pacemaker in situ POA: Yes    Dementia POA: Unknown    Thrombocytopenia (HCC) POA: Unknown    Deep vein thrombosis (DVT) (HCC) POA: Unknown    Normocytic anemia POA: Unknown    Hypomagnesemia POA: Unknown  Resolved Problems:    * No resolved hospital problems. *      FOLLOW UP  He will need to be seen by urology for Cabello catheter removal and management  [facility], Emergency Dept  [address]  [facility] [id number] [phone]    If symptoms worsen    JEREMIAH Holland  [address]  [address]  [facility] [address]  [phone]      As needed    Phuc Chahal M.D.  [address]  [facility] [address]  [phone]      call for follow up    [facility] ([facility])  [address]  [facility] [address]  [phone]          MEDICATIONS ON DISCHARGE     Medication List      START taking these medications      Instructions   dicyclomine 20 MG Tabs  Commonly known as:  BENTYL   Take 1 Tab by mouth every 6 hours.  Dose:  20 mg     ferrous sulfate 325 (65 Fe) MG tablet  Start taking on:  12/23/2018   Take 1 Tab by mouth every morning with breakfast.  Dose:  325 mg     phosphorus 155-852-130 MG tablet  Commonly known as:  K-PHOS-NEUTRAL, PHOSPHA 250 NEUTRAL   Take 2 Tabs by mouth 3 times a day for 5 doses.  Dose:  2 Tab     * rivaroxaban 15 MG Tabs tablet  Commonly known as:  XARELTO   Take 1 Tab by mouth 2 times a day, with meals for 19 days.  Dose:  15 mg     * rivaroxaban 20 MG Tabs tablet  Start taking on:  1/10/2019  Commonly known as:  XARELTO   Take 1 Tab by mouth with  dinner.  Dose:  20 mg     tamsulosin 0.4 MG capsule  Commonly known as:  FLOMAX   Take 1 Cap by mouth every day.  Dose:  0.4 mg        * This list has 2 medication(s) that are the same as other medications prescribed for you. Read the directions carefully, and ask your doctor or other care provider to review them with you.            CONTINUE taking these medications      Instructions   atorvastatin 10 MG Tabs  Commonly known as:  LIPITOR   Take 10 mg by mouth every day.  Dose:  10 mg     Calcium 600 + D 600-400 MG-UNIT Tabs  Generic drug:  Calcium Carbonate-Vitamin D   Take 1 Tab by mouth every day.  Dose:  1 Tab     melatonin 3 MG Tabs   Take 3 mg by mouth every bedtime.  Dose:  3 mg     metFORMIN 500 MG Tabs  Commonly known as:  GLUCOPHAGE   Take 500 mg by mouth 2 times a day, with meals.  Dose:  500 mg     metoprolol 50 MG Tabs  Commonly known as:  LOPRESSOR   Take 1 Tab by mouth 2 times a day.  Dose:  50 mg     mirtazapine 7.5 MG tablet  Commonly known as:  REMERON   Take 7.5 mg by mouth every evening.  Dose:  7.5 mg     omeprazole 20 MG delayed-release capsule  Commonly known as:  PRILOSEC   Take 20 mg by mouth 2 times a day.  Dose:  20 mg     QUEtiapine 25 MG Tabs  Commonly known as:  SEROQUEL   Take 25 mg by mouth every bedtime.  Dose:  25 mg        STOP taking these medications    amoxicillin-clavulanate 875-125 MG Tabs  Commonly known as:  AUGMENTIN     predniSONE 10 MG Tabs  Commonly known as:  DELTASONE     tramadol 50 MG Tabs  Commonly known as:  ULTRAM            Allergies  No Known Allergies    DIET  Orders Placed This Encounter   Procedures   • Diet Order Diabetic     Standing Status:   Standing     Number of Occurrences:   1     Order Specific Question:   Diet:     Answer:   Diabetic [3]     Order Specific Question:   Miscellaneous modifications:     Answer:   Finger Foods  [2]   • Discontinue Diet Tray     Standing Status:   Standing     Number of Occurrences:   1       ACTIVITY  As  tolerated.  Weight bearing as tolerated    CONSULTATIONS  None    PROCEDURES  None    LABORATORY  Lab Results   Component Value Date    SODIUM 135 12/22/2018    POTASSIUM 3.8 12/22/2018    CHLORIDE 110 12/22/2018    CO2 22 12/22/2018    GLUCOSE 113 (H) 12/22/2018    BUN 7 (L) 12/22/2018    CREATININE 0.67 12/22/2018        Lab Results   Component Value Date    WBC 6.3 12/21/2018    HEMOGLOBIN 11.4 (L) 12/21/2018    HEMATOCRIT 35.4 (L) 12/21/2018    PLATELETCT 182 12/21/2018        Total time of the discharge process exceeds 43 minutes.

## 2018-12-22 NOTE — DOCUMENTATION QUERY
DOCUMENTATION QUERY    PROVIDERS: Please select “Cosign w/ note”to reply to query.    To better represent the severity of illness of your patient, please review the following information and exercise your independent professional judgment in responding to this query.     Urinary retention, may be medication side effect vs BPH is documented in the History and Physical/Progress Notes. Based upon the clinical findings, risk factors, and treatment, can the relationship between these two conditions be further specified?    • There is no relationship between urinary retention and current medications  • The urinary retention is secondary to the medication  • There is no relationship between the urinary retention and BPH  • The urinary retention is secondary to BPH   • Other explanation of clinical findings  • Findings of no clinical significance  • Unable to determine        The medical record reflects the following:   Clinical Findings -adm with distended bladder  -erwin placed w/2600cc of urine   -bilat hydronephrosis likely due to back pressure for distended bladder  -bilat lower ext DVT's   Treatment -resume flomax  -urology follow up   -cont erwin for decompression  -support care    Risk Factors Declining health status unable to care for himself in assisted living, new bilat DVT's, hypokalemia, and other co-morbid conditions   Location within medical record  History and Physical, Progress Notes, Lab Results  and Radiology Results     Thank you,   Sydnee Ramírez RN  Clinical   Ext [phone]

## 2018-12-23 NOTE — PROGRESS NOTES
Telemetry Shift Summary    Rhythm Afib->SR @1000  HR Range 70s-80s  Ectopy Freq PACs  Measurements -/.08/-        Normal Values  Rhythm SR  HR Range    Measurements 0.12-0.20 / 0.06-0.10  / 0.30-0.52

## 2018-12-23 NOTE — DISCHARGE INSTRUCTIONS
Discharge Instructions    Discharged to other by medical transportation with escort. Discharged via wheelchair, hospital escort: Yes.  Special equipment needed: Not Applicable    Be sure to schedule a follow-up appointment with your primary care doctor or any specialists as instructed.     Discharge Plan:   Diet Plan: Discussed  Activity Level: Discussed  Confirmed Follow up Appointment: Patient to Call and Schedule Appointment  Confirmed Symptoms Management: Discussed  Medication Reconciliation Updated: Yes  Influenza Vaccine Indication: Indicated: 65 years and older  Influenza Vaccine Given - only chart on this line when given: Influenza Vaccine Given (See MAR)    I understand that a diet low in cholesterol, fat, and sodium is recommended for good health. Unless I have been given specific instructions below for another diet, I accept this instruction as my diet prescription.   Other diet: diabetic, finger foods, help with feeding    Special Instructions: None    · Is patient discharged on Warfarin / Coumadin?   No     Depression / Suicide Risk    As you are discharged from this [facility] facility, it is important to learn how to keep safe from harming yourself.    Recognize the warning signs:  · Abrupt changes in personality, positive or negative- including increase in energy   · Giving away possessions  · Change in eating patterns- significant weight changes-  positive or negative  · Change in sleeping patterns- unable to sleep or sleeping all the time   · Unwillingness or inability to communicate  · Depression  · Unusual sadness, discouragement and loneliness  · Talk of wanting to die  · Neglect of personal appearance   · Rebelliousness- reckless behavior  · Withdrawal from people/activities they love  · Confusion- inability to concentrate     If you or a loved one observes any of these behaviors or has concerns about self-harm, here's what you can do:  · Talk about it- your feelings and reasons for harming  yourself  · Remove any means that you might use to hurt yourself (examples: pills, rope, extension cords, firearm)  · Get professional help from the community (Mental Health, Substance Abuse, psychological counseling)  · Do not be alone:Call your Safe Contact- someone whom you trust who will be there for you.  · Call your local CRISIS HOTLINE 776-5663 or 838-392-5881  · Call your local Children's Mobile Crisis Response Team [place] (903) 893-0872 or wwwPixowl  · Call the toll free National Suicide Prevention Hotlines   · National Suicide Prevention Lifeline 182-294-AJPE (2415)  · [employer] Hope Line Network 800-SUICIDE (810-3481)        Acute Urinary Retention, Male  Acute urinary retention is when you are unable to pee (urinate). Acute urinary retention is common in older men. Prostates can get bigger, which blocks the flow of pee.  Follow these instructions at home:  · Drink enough fluids to keep your pee clear or pale yellow.  · If you are sent home with a tube that drains the bladder (catheter), there will be a drainage bag attached to it. There are two types of bags. One is big that you can wear at night without having to empty it. One is smaller and needs to be emptied more often.  ¨ Keep the drainage bag empty.  ¨ Keep the drainage bag lower than your catheter.  · Only take medicine as told by your doctor.  Contact a doctor if:  · You have a low-grade fever.  · You have spasms or you are leaking pee when you have spasms.  Get help right away if:  · You have chills or a fever.  · Your catheter stops draining pee.  · Your catheter falls out.  · You have increased bleeding that does not stop after you have rested and increased the amount of fluids you had been drinking.  This information is not intended to replace advice given to you by your health care provider. Make sure you discuss any questions you have with your health care provider.  Document Released: 06/05/2009 Document Revised:  05/25/2017 Document Reviewed: 05/29/2014  Elsevier Interactive Patient Education © 2017 Elsevier Inc.

## 2018-12-23 NOTE — PROGRESS NOTES
Discharge order written. IV removed, patient tolerated well. Tele removed and returned to monitor tech. Belongings gathered. Pt states that all personal belongings are in possession. AVS printed, reviewed and copy signed and placed on the chart. Daughter notified of pt transport time. Patient has no further questions.   Report called over to [facility] to RUPERTO Briones. Discharged in satisfactory condition to [facility], via medical transport Pt off unit via wheelchair, escorted by medical transport staff.    May from [facility] stated that they either need a hard copy prescription for the tramadol or to discontinue it. MD paged regarding this matter. Will call May back when this issue is resolved.

## 2018-12-23 NOTE — DISCHARGE PLANNING
NATASHA spoke to pt's dtr, Rita.  She would like pt to transfer to [facility].  Transport was set up for 1700.      Rita gave verbal consent for pt's transfer.

## 2019-01-29 ENCOUNTER — APPOINTMENT (OUTPATIENT)
Dept: ADMISSIONS | Facility: MEDICAL CENTER | Age: 79
End: 2019-01-29
Attending: UROLOGY
Payer: MEDICARE

## 2019-01-29 RX ORDER — CIPROFLOXACIN 500 MG/1
500 TABLET, FILM COATED ORAL 2 TIMES DAILY
COMMUNITY

## 2019-01-29 NOTE — OR NURSING
Daughter of patient Valery Barajas notified per this RN of transportation resources available ([facility]) and care services available as patient needs someone to accompany him day of procedure while he is here at the hospital due to his dementia. She states she will make arrangements. Also advised that she needs to be available day of procedure at check in time ([id number]) for 3 way telephone consent. She confirms.

## 2019-01-29 NOTE — OR NURSING
Pre-admit phone appointment done with patient's daughter= Valery Barajas who has POA; legal document appears in EPIC.

## 2019-01-29 NOTE — OR NURSING
Rajan pre-op supervisor called (Lizette) and verified that 3-way phone consent for I.R. Procedure was ok to do day of procedure with daughter Valery Barajas (POA).

## 2019-02-04 ENCOUNTER — HOSPITAL ENCOUNTER (OUTPATIENT)
Facility: MEDICAL CENTER | Age: 79
End: 2019-02-04
Attending: UROLOGY | Admitting: UROLOGY
Payer: MEDICARE

## 2019-02-04 ENCOUNTER — APPOINTMENT (OUTPATIENT)
Dept: RADIOLOGY | Facility: MEDICAL CENTER | Age: 79
End: 2019-02-04
Attending: NURSE PRACTITIONER
Payer: MEDICARE

## 2019-02-04 VITALS
WEIGHT: 174.16 LBS | RESPIRATION RATE: 18 BRPM | DIASTOLIC BLOOD PRESSURE: 57 MMHG | BODY MASS INDEX: 26.4 KG/M2 | OXYGEN SATURATION: 96 % | HEART RATE: 59 BPM | SYSTOLIC BLOOD PRESSURE: 111 MMHG | HEIGHT: 68 IN | TEMPERATURE: 98.2 F

## 2019-02-04 DIAGNOSIS — R33.9 RETENTION OF URINE, UNSPECIFIED: ICD-10-CM

## 2019-02-04 LAB
CFT BLD TEG: 4.5 MIN (ref 5–10)
CLOT ANGLE BLD TEG: 71.4 DEGREES (ref 53–72)
CLOT LYSIS 30M P MA LENFR BLD TEG: 0 % (ref 0–8)
CT.EXTRINSIC BLD ROTEM: 1.3 MIN (ref 1–3)
INR PPP: 1.16 (ref 0.87–1.13)
MCF BLD TEG: 73.3 MM (ref 50–70)
PLATELET # BLD AUTO: 263 K/UL (ref 164–446)
PROTHROMBIN TIME: 14.9 SEC (ref 12–14.6)
TEG ALGORITHM TGALG: ABNORMAL

## 2019-02-04 PROCEDURE — 99153 MOD SED SAME PHYS/QHP EA: CPT

## 2019-02-04 PROCEDURE — 85384 FIBRINOGEN ACTIVITY: CPT

## 2019-02-04 PROCEDURE — 700117 HCHG RX CONTRAST REV CODE 255: Performed by: RADIOLOGY

## 2019-02-04 PROCEDURE — 85049 AUTOMATED PLATELET COUNT: CPT

## 2019-02-04 PROCEDURE — 160002 HCHG RECOVERY MINUTES (STAT)

## 2019-02-04 PROCEDURE — 85576 BLOOD PLATELET AGGREGATION: CPT

## 2019-02-04 PROCEDURE — 85610 PROTHROMBIN TIME: CPT

## 2019-02-04 PROCEDURE — 700111 HCHG RX REV CODE 636 W/ 250 OVERRIDE (IP)

## 2019-02-04 PROCEDURE — 85347 COAGULATION TIME ACTIVATED: CPT

## 2019-02-04 PROCEDURE — 51102 DRAIN BL W/CATH INSERTION: CPT

## 2019-02-04 RX ORDER — MIDAZOLAM HYDROCHLORIDE 1 MG/ML
.5-2 INJECTION INTRAMUSCULAR; INTRAVENOUS PRN
Status: DISCONTINUED | OUTPATIENT
Start: 2019-02-04 | End: 2019-02-04 | Stop reason: HOSPADM

## 2019-02-04 RX ORDER — MIDAZOLAM HYDROCHLORIDE 1 MG/ML
INJECTION INTRAMUSCULAR; INTRAVENOUS
Status: COMPLETED
Start: 2019-02-04 | End: 2019-02-04

## 2019-02-04 RX ORDER — OXYCODONE HYDROCHLORIDE 5 MG/1
5 TABLET ORAL
Status: DISCONTINUED | OUTPATIENT
Start: 2019-02-04 | End: 2019-02-04 | Stop reason: HOSPADM

## 2019-02-04 RX ORDER — ONDANSETRON 2 MG/ML
4 INJECTION INTRAMUSCULAR; INTRAVENOUS EVERY 8 HOURS PRN
Status: DISCONTINUED | OUTPATIENT
Start: 2019-02-04 | End: 2019-02-04 | Stop reason: HOSPADM

## 2019-02-04 RX ORDER — CEFAZOLIN SODIUM 1 G/3ML
INJECTION, POWDER, FOR SOLUTION INTRAMUSCULAR; INTRAVENOUS
Status: COMPLETED
Start: 2019-02-04 | End: 2019-02-04

## 2019-02-04 RX ORDER — ONDANSETRON 2 MG/ML
4 INJECTION INTRAMUSCULAR; INTRAVENOUS PRN
Status: DISCONTINUED | OUTPATIENT
Start: 2019-02-04 | End: 2019-02-04 | Stop reason: HOSPADM

## 2019-02-04 RX ORDER — SODIUM CHLORIDE 9 MG/ML
500 INJECTION, SOLUTION INTRAVENOUS
Status: DISCONTINUED | OUTPATIENT
Start: 2019-02-04 | End: 2019-02-04 | Stop reason: HOSPADM

## 2019-02-04 RX ORDER — CEFAZOLIN SODIUM 2 G/100ML
2 INJECTION, SOLUTION INTRAVENOUS ONCE
Status: COMPLETED | OUTPATIENT
Start: 2019-02-04 | End: 2019-02-04

## 2019-02-04 RX ORDER — MORPHINE SULFATE 4 MG/ML
4 INJECTION, SOLUTION INTRAMUSCULAR; INTRAVENOUS
Status: DISCONTINUED | OUTPATIENT
Start: 2019-02-04 | End: 2019-02-04 | Stop reason: HOSPADM

## 2019-02-04 RX ADMIN — IOHEXOL 50 ML: 300 INJECTION, SOLUTION INTRAVENOUS at 15:15

## 2019-02-04 RX ADMIN — MIDAZOLAM 1 MG: 1 INJECTION INTRAMUSCULAR; INTRAVENOUS at 14:25

## 2019-02-04 RX ADMIN — MIDAZOLAM HYDROCHLORIDE 1 MG: 1 INJECTION INTRAMUSCULAR; INTRAVENOUS at 14:25

## 2019-02-04 RX ADMIN — CEFAZOLIN 2000 MG: 330 INJECTION, POWDER, FOR SOLUTION INTRAMUSCULAR; INTRAVENOUS at 14:28

## 2019-02-04 RX ADMIN — FENTANYL CITRATE 50 MCG: 50 INJECTION, SOLUTION INTRAMUSCULAR; INTRAVENOUS at 14:25

## 2019-02-04 NOTE — H&P
History and Physical    Date: 2/4/2019    PCP: Miriam Peña M.D.      CC: Urinary retention    HPI: This is a 79 y.o. male who is presenting with above complaint    Past Medical History:   Diagnosis Date   • A-fib (HCC)     Unknown date when dx   • Acid reflux disease    • Blood clotting disorder (HCC) 2018    leg   • Diabetes ([facility])     Type II   • High cholesterol    • Normal pressure hydrocephalus    • Pacemaker 07/2018   • Psychiatric problem 01/29/2019    depression and dementia   • Urinary incontinence        Past Surgical History:   Procedure Laterality Date   • OTHER ORTHOPEDIC SURGERY      both shoulders and right knee       No current facility-administered medications for this encounter.         Social History     Social History   • Marital status:      Spouse name: N/A   • Number of children: N/A   • Years of education: N/A     Occupational History   • Not on file.     Social History Main Topics   • Smoking status: Never Smoker   • Smokeless tobacco: Never Used   • Alcohol use No      Comment: Pt states he has quit drinking   • Drug use: No   • Sexual activity: Not on file     Other Topics Concern   • Not on file     Social History Narrative   • No narrative on file       Family History   Problem Relation Age of Onset   • No Known Problems Mother    • Heart Disease Father        Allergies:  Patient has no known allergies.    Review of Systems:  Negative except for dementia    Physical Exam   Constitutional: He appears well-developed and well-nourished.   HENT:   Head: Normocephalic and atraumatic.   Eyes: Conjunctivae are normal.   Pulmonary/Chest: Effort normal.   Abdominal: Soft.   Neurological: He is alert.   Skin: Skin is warm and dry.   Psychiatric: He has a normal mood and affect. His behavior is normal. Thought content normal.       Vital Signs  Blood Pressure : 111/57   Temperature: 36.2 °C (97.2 °F)   Pulse: 63   Respiration: 20   Pulse Oximetry: 97 %        Labs:  Recent Labs       02/04/19   0930   PLATELETCT  263         Recent Labs      02/04/19   0930   INR  1.16*     Recent Labs      02/04/19   0930   INR  1.16*       Radiology:  IR-DRAIN-BLADDER SUPRAPUB W/CATH    (Results Pending)             Assessment and Plan:This is a 79 y.o. With urinary retention here for a suprapubic catheter

## 2019-02-04 NOTE — DISCHARGE INSTRUCTIONS
ACTIVITY: Rest and take it easy for the first 24 hours.  A responsible adult is recommended to remain with you during that time.  It is normal to feel sleepy.  We encourage you to not do anything that requires balance, judgment or coordination.    MILD FLU-LIKE SYMPTOMS ARE NORMAL. YOU MAY EXPERIENCE GENERALIZED MUSCLE ACHES, THROAT IRRITATION, HEADACHE AND/OR SOME NAUSEA.    FOR 24 HOURS DO NOT:  Drive, operate machinery or run household appliances.  Drink beer or alcoholic beverages.   Make important decisions or sign legal documents.    SPECIAL INSTRUCTIONS: follow up with primary care physician as needed  Wound noticed on patients penis, care giver notified, POA daughter notified, patient to follow up with PCP  Care giver Panchito of good old days notified about patients wound as well.   Continue with your medications as ordered.  Keep dressing clean dry intact.     DIET: To avoid nausea, slowly advance diet as tolerated, avoiding spicy or greasy foods for the first day.  Add more substantial food to your diet according to your physician's instructions.  Babies can be fed formula or breast milk as soon as they are hungry.  INCREASE FLUIDS AND FIBER TO AVOID CONSTIPATION.    SURGICAL DRESSING/BATHING: keep dressing clean dry intact. Cover when shower.    FOLLOW-UP APPOINTMENT:  A follow-up appointment should be arranged with your doctor in [phone]; call to schedule.    You should CALL YOUR PHYSICIAN if you develop:  Fever greater than 101 degrees F.  Pain not relieved by medication, or persistent nausea or vomiting.  Excessive bleeding (blood soaking through dressing) or unexpected drainage from the wound.  Extreme redness or swelling around the incision site, drainage of pus or foul smelling drainage.  Inability to urinate or empty your bladder within 8 hours.  Problems with breathing or chest pain.    You should call 911 if you develop problems with breathing or chest pain.  If you are unable to contact your  doctor or surgical center, you should go to the nearest emergency room or urgent care center.  Physician's telephone #: [phone]    If any questions arise, call your doctor.  If your doctor is not available, please feel free to call the Surgical Center at [phone].  The Center is open Monday through Friday from 7AM to 7PM.  You can also call the [facility] [employer] HOTLINE open 24 hours/day, 7 days/week and speak to a nurse at [phone], or toll free at [phone].    A registered nurse may call you a few days after your surgery to see how you are doing after your procedure.    MEDICATIONS: Resume taking daily medication.  Take prescribed pain medication with food.  If no medication is prescribed, you may take non-aspirin pain medication if needed.  PAIN MEDICATION CAN BE VERY CONSTIPATING.  Take a stool softener or laxative such as senokot, pericolace, or milk of magnesia if needed.  Suprapubic Catheter Replacement, Care After  Refer to this sheet in the next few weeks. These instructions provide you with information on caring for yourself after changing your catheter. Your caregiver may also give you more specific instructions. Call your caregiver if you have any problems or questions after you change your catheter.  HOME CARE INSTRUCTIONS   · Take all medicines prescribed by your caregiver. Follow the directions carefully.  · Drink 8 glasses of water every day. This produces good urine flow.  · Check the skin around your catheter a few times every day. Watch for redness and swelling. Look for any fluids coming out of the opening.  · Do not use powder or cream around the catheter opening.  · Do not take tub baths or use pools or hot tubs.  · Keep all follow-up appointments.  SEEK MEDICAL CARE IF:   · You leak urine.  · Your skin around the catheter becomes red or sore.  · Your urine flow slows down.  · Your urine gets cloudy or smelly.  SEEK IMMEDIATE MEDICAL CARE IF:   · You have chills, nausea, or back  pain.  · You have trouble changing your catheter.  · Your catheter comes out.  · You have blood in your urine.  · You have no urine flow for 1 hour.  · You have a fever.     This information is not intended to replace advice given to you by your health care provider. Make sure you discuss any questions you have with your health care provider.     Document Released: 09/04/2012 Document Revised: 01/08/2016 Document Reviewed: 09/04/2012  [employer] Patient Education ©2016 [employer]      If your physician has prescribed pain medication that includes Acetaminophen (Tylenol), do not take additional Acetaminophen (Tylenol) while taking the prescribed medication.    Depression / Suicide Risk    As you are discharged from this [facility] facility, it is important to learn how to keep safe from harming yourself.    Recognize the warning signs:  · Abrupt changes in personality, positive or negative- including increase in energy   · Giving away possessions  · Change in eating patterns- significant weight changes-  positive or negative  · Change in sleeping patterns- unable to sleep or sleeping all the time   · Unwillingness or inability to communicate  · Depression  · Unusual sadness, discouragement and loneliness  · Talk of wanting to die  · Neglect of personal appearance   · Rebelliousness- reckless behavior  · Withdrawal from people/activities they love  · Confusion- inability to concentrate     If you or a loved one observes any of these behaviors or has concerns about self-harm, here's what you can do:  · Talk about it- your feelings and reasons for harming yourself  · Remove any means that you might use to hurt yourself (examples: pills, rope, extension cords, firearm)  · Get professional help from the community (Mental Health, Substance Abuse, psychological counseling)  · Do not be alone:Call your Safe Contact- someone whom you trust who will be there for you.  · Call your local CRISIS HOTLINE 314-9461  or 395-126-1301  · Call your local Children's Mobile Crisis Response Team [place] (262) 715-7611 or www.[employer]  · Call the toll free National Suicide Prevention Hotlines   · National Suicide Prevention Lifeline 338-276-SUFU (8045)  · National [employer] Line Network 800-SUICIDE (052-3250)

## 2019-02-04 NOTE — PROGRESS NOTES
IR Nursing Note:    Patient and POA consented by Dr. Mitchell, all questions answered. Dr. Mitchell performed suprapubic catheter placement.  The pt tolerated the procedure well; ETCo2 baseline 28, with consistent waveform during the procedure.  Sutures, gauze and medipore tape applied to Midline LQ, CDI and soft.  Pt alert and verbally appropriate post procedure, vital signs stable during procedure  and transport, see flow sheet for vital signs.  Report given to RUPERTO Gutierrez.  RN transported pt to [facility] with Sao2 monitor.      Freebeepay Flexima APDL Locking Pigtail 36Di82le. REF [id number]. LOT [id number]    Upon removing erwin, wound observed on meatus. Dr. Mitchell to bedside to observe, no orders received.

## 2019-02-04 NOTE — OR NURSING
Dr. Mitchell spoke with Rita LAZO). This RN with Jessica, RN verified ANGELITA consent over the phone.

## 2019-02-04 NOTE — OR NURSING
Partial patient med reconciliation completed with provided medication list and administration times provided by patient's care taker at bedside. Care taker notified this RN that patient took all his medications yesterday.

## 2019-02-04 NOTE — OR NURSING
1000: MD notified re: Xarelto administration yesterday, TEG ordered. Care taker updated re: POC. Patient given warm blanket.     1130: Case delayed due to another case in progress. Estimated start time for procedure moved to approximately 1330. Care taker updated re: POC. Patient given warm blanket     1230: Rita (ANGELITA) called, message left with [facility] contact information. Care taker provided with sprite, and patient provided with warm blanket.     1245: POA returned phone call. POA updated re: POC     1315: Patient provided with warm blanket.

## 2019-02-05 NOTE — OR NURSING
1455 Report received from Nohemi HICKEY  1456 patient arrived from IR s/p suprapubic catheter placement. Patient awake, no s/s of distress or discomfort. Vital signs stable. Per IR nurse Pura patient has a wound on his penis. Will document and notify patients group home for follow up. Surgical site clean with leg bag attached.   1500 patients care giver at the bed side.  1531 POA daughter notified about patients wound. Patient to follow up with PCP and the group home doctor. Patients care giver at the bed side aware of the patients wound.  Care giver Panchito of good old days notified about patients wound.   1546 wound picture taken.   1550 Criteria met to discharge patient home.  1600 discharge instructions reviewed with patients care giver verbalizing understanding. IV discontinued tip intact.  1609 patient escorted via w/c with all his belongings.

## 2022-01-27 NOTE — PROGRESS NOTES
Received report and care assumed. Patient A&Ox 4. Pt. Reports no pain. Work of breathing even and unlabored on room air. Discussed POC. Call light within reach and pt. instructed to call when in need of assistance.  Denies any other needs at this time.      Medical Necessity Statement: Based on my medical judgement, Mohs surgery is the most appropriate treatment for this cancer compared to other treatments.

## 2022-07-19 NOTE — PROGRESS NOTES
[facility] Medicine Daily Progress Note    Date of Service  12/21/2018    Chief Complaint  78 y.o. male admitted 12/18/2018 with abdominal distention and discomfort.    Hospital Course     is a very pleasant 70-year-old male with a past medical history of underlying dementia, currently living in an assisted living facility presented to the ER for abdominal distention, found to have urinary retention with over 2600 cc removed for Erwin placement.  In addition patient was diagnosed with having a bilateral lower extremity DVTs      Interval Problem Update  12/20: Tolerating anticoagulation, change to Xarelto.  Erwin catheter in place, good urine output.  Pending skilled nursing placement  12/21: Hgb stable, rate controlled, tolerating anticoagulation with xarelto.      Consultants/Specialty  None    Code Status  DNAR/DNI    [facility] [place]  [facility]    Review of Systems  Review of Systems   Unable to perform ROS: Dementia        Physical Exam  Temp:  [36.4 °C (97.6 °F)-37 °C (98.6 °F)] 36.4 °C (97.6 °F)  Pulse:  [] 93  Resp:  [16-18] 16  BP: ()/(68-80) 99/68    Physical Exam   Constitutional: He appears well-developed. No distress.   HENT:   Head: Normocephalic and atraumatic.   Mouth/Throat: No oropharyngeal exudate.   Eyes: Pupils are equal, round, and reactive to light. Conjunctivae are normal. Right eye exhibits no discharge. No scleral icterus.   Neck: Neck supple. No JVD present. No thyromegaly present.   Cardiovascular: Intact distal pulses.    No murmur heard.  Pulmonary/Chest: Effort normal and breath sounds normal. No stridor. No respiratory distress. He has no wheezes. He has no rales.   Abdominal: Soft. Bowel sounds are normal. He exhibits no distension. There is no tenderness. There is no rebound.   Genitourinary:   Genitourinary Comments: +erwin   Musculoskeletal: Normal range of motion. He exhibits no edema.   Neurological: He is alert. No cranial nerve deficit.   Skin:  Skin is warm and dry. He is not diaphoretic. No erythema. There is pallor.   Psychiatric: He is slowed. Cognition and memory are impaired.       Fluids    Intake/Output Summary (Last 24 hours) at 12/21/18 1720  Last data filed at 12/21/18 1400   Gross per 24 hour   Intake           3567.5 ml   Output             2000 ml   Net           1567.5 ml       Laboratory  Recent Labs      12/19/18   0449  12/20/18   0410  12/21/18   0437   WBC  7.9  7.4  6.3   RBC  4.04*  3.83*  3.83*   HEMOGLOBIN  12.1*  11.4*  11.4*   HEMATOCRIT  37.5*  35.4*  35.4*   MCV  92.8  92.4  92.4   MCH  30.0  29.8  29.8   MCHC  32.3*  32.2*  32.2*   RDW  46.8  46.5  46.5   PLATELETCT  154*  156*  182   MPV  11.3  10.8  11.5     Recent Labs      12/19/18   0450  12/20/18   0410  12/21/18   0437   SODIUM  140  138  136   POTASSIUM  3.0*  3.3*  3.3*   CHLORIDE  106  107  107   CO2  27  26  23   GLUCOSE  91  89  90   BUN  14  9  7*   CREATININE  0.87  0.79  0.70   CALCIUM  8.5  8.0*  7.6*                   Imaging  US-EXTREMITY VENOUS LOWER BILAT   Final Result      Extensive bilateral lower extremity deep vein thrombosis.      Thrombosis within the greater saphenous veins bilaterally.      Currently awaiting callback from Lorena Gonzales.      CT-ABDOMEN-PELVIS WITH   Final Result         1. Significantly distended urinary bladder likely secondary to bladder outlet obstruction.      2. Moderate bilateral hydronephrosis, likely secondary to back pressure from distended urine bladder.      3. Mild infrarenal abdominal aortic aneurysm. Ascending aortic aneurysm, measuring 4.2 cm.      4. Questionable poor enhancement of the bilateral iliac and inguinal veins could be due to timing of contrast. Correlate with ultrasound.      5. Peripheral opacity opacities in the lung bases could be seen with interstitial lung disease.           Assessment/Plan  * Urinary retention   Assessment & Plan    Distended on admission, s/p erwin insertion with 2600cc  drainage.  Resume Flomax.  patient will need outpatient urology follow-up with at least one week Cabello catheter placement for bladder decompression  He will discharge with Cabello catheter     Generalized weakness- (present on admission)   Assessment & Plan    Currently lives in assisted living facility, has family in [place].  He will DC to SNF when bed available     Hypokalemia- (present on admission)   Assessment & Plan    Replenish lytes, will repeat bmp in the morning for any changes  Replace IV     Hypomagnesemia   Assessment & Plan    Replace IV, repeat in the morning     Normocytic anemia   Assessment & Plan    Slow downtrend, patient denies any blood loss.  He is a poor historian  Check iron studies     Deep vein thrombosis (DVT) ([facility])   Assessment & Plan    + bilateral LE DVT  He has been tolerating Lovenox  Continue Xarelto which he will discharged on     Thrombocytopenia ([facility])   Assessment & Plan    Chronic, no evidence of gross bleeding  CTM     Dementia   Assessment & Plan    Poor historian,  Resume home dose of Seroquel and Remeron.       Cardiac pacemaker in situ- (present on admission)   Assessment & Plan    Paced rhythm   Stable      Type 2 diabetes mellitus without complication, without long-term current use of insulin ([facility])- (present on admission)   Assessment & Plan    Continue metformin  Check a1c.       Permanent atrial fibrillation ([facility])- (present on admission)   Assessment & Plan    Rate controlled  Continue metoprolol BP ranging from 90s to 110s, HR is controlled continue current dose  Most likely not a good candidate for long-term anticoagulation for atrial fibrillation however for his bilateral lower extremity edema will at least require 3 months.              VTE prophylaxis: Xarelto       Finasteride Pregnancy And Lactation Text: This medication is absolutely contraindicated during pregnancy. It is unknown if it is excreted in breast milk.

## 2023-11-08 NOTE — PROGRESS NOTES
Pt left the floor with transport for Cath lab.  Monitor room notified.   Bed in lowest position, wheels locked, appropriate side rails in place/Call bell, personal items and telephone in reach/Instruct patient to call for assistance before getting out of bed or chair/Non-slip footwear when patient is out of bed/[place] to call system/Physically safe environment - no spills, clutter or unnecessary equipment/Purposeful Proactive Rounding/Room/bathroom lighting operational, light cord in reach